# Patient Record
Sex: FEMALE | Race: OTHER | HISPANIC OR LATINO
[De-identification: names, ages, dates, MRNs, and addresses within clinical notes are randomized per-mention and may not be internally consistent; named-entity substitution may affect disease eponyms.]

---

## 2023-06-03 PROBLEM — Z00.00 ENCOUNTER FOR PREVENTIVE HEALTH EXAMINATION: Status: ACTIVE | Noted: 2023-06-03

## 2023-06-07 ENCOUNTER — NON-APPOINTMENT (OUTPATIENT)
Age: 38
End: 2023-06-07

## 2023-06-12 ENCOUNTER — APPOINTMENT (OUTPATIENT)
Dept: BARIATRICS | Facility: CLINIC | Age: 38
End: 2023-06-12
Payer: COMMERCIAL

## 2023-06-12 VITALS
SYSTOLIC BLOOD PRESSURE: 119 MMHG | DIASTOLIC BLOOD PRESSURE: 78 MMHG | OXYGEN SATURATION: 100 % | HEART RATE: 84 BPM | WEIGHT: 187 LBS | TEMPERATURE: 97.5 F | HEIGHT: 61 IN | BODY MASS INDEX: 35.3 KG/M2

## 2023-06-12 DIAGNOSIS — E78.1 PURE HYPERGLYCERIDEMIA: ICD-10-CM

## 2023-06-12 DIAGNOSIS — Z78.9 OTHER SPECIFIED HEALTH STATUS: ICD-10-CM

## 2023-06-12 DIAGNOSIS — Z82.49 FAMILY HISTORY OF ISCHEMIC HEART DISEASE AND OTHER DISEASES OF THE CIRCULATORY SYSTEM: ICD-10-CM

## 2023-06-12 DIAGNOSIS — Z83.49 FAMILY HISTORY OF OTHER ENDOCRINE, NUTRITIONAL AND METABOLIC DISEASES: ICD-10-CM

## 2023-06-12 DIAGNOSIS — Z80.0 FAMILY HISTORY OF MALIGNANT NEOPLASM OF DIGESTIVE ORGANS: ICD-10-CM

## 2023-06-12 DIAGNOSIS — Z82.5 FAMILY HISTORY OF ASTHMA AND OTHER CHRONIC LOWER RESPIRATORY DISEASES: ICD-10-CM

## 2023-06-12 DIAGNOSIS — Z83.3 FAMILY HISTORY OF DIABETES MELLITUS: ICD-10-CM

## 2023-06-12 DIAGNOSIS — Z87.19 PERSONAL HISTORY OF OTHER DISEASES OF THE DIGESTIVE SYSTEM: ICD-10-CM

## 2023-06-12 DIAGNOSIS — M25.473 EFFUSION, UNSPECIFIED ANKLE: ICD-10-CM

## 2023-06-12 PROCEDURE — 99204 OFFICE O/P NEW MOD 45 MIN: CPT

## 2023-06-12 NOTE — HISTORY OF PRESENT ILLNESS
[de-identified] : Patient is a 37 year old woman with history of hypertension, hyperlipidemia, asthma, GERD, gastritis, cholelithiasis, diverticulosis, surgical history of Caesarean section who presents now with morbid obesity and for evaluation of bariatric surgery. The patient has been overweight for more than 5 years and has tried multiple diet regimens which resulted in weight loss that was regained in the months following the diet. The patient has also tried fad diets to lose weight without success. \par \par Today's weight: 187\par Highest weight: 195\par Height: 5'1"\par \par Smoking history? marijuana twice a week\par Alcohol use? social\par NSAID use? none\par Birth control: none\par Cardiac/Vascular Stents: none\par ICD/Pacemaker: none\par Blood Thinners: none\par DVT/PE History: none \par Swallowing Issues: none\par GERD/GI Symptoms: intermittent, triggered with spicy foods\par \par STOP BANG Questionnaire\par Does patient snore? yes\par Does patient often feel tired, fatigued, or sleepy during daytime? yes\par Has anyone observed patient stop breathing during sleep? no\par Does patient have or is being treated for high blood pressure? yes\par BMI > 35? yes\par Age > 50 years old? no\par Neck circumference: medium\par Male gender? no\par SCORE =  4\par \par CIARA - Low Risk : Yes to 0-2 questions\par CIARA - Intermediate Risk: Yes to 3-4 questions\par CIARA - High Risk: Yes to 5-8 questions\par \par

## 2023-06-12 NOTE — ASSESSMENT
[FreeTextEntry1] : It was my pleasure to interact with Ms. SHEKHAR WILL today. In summary, Ms. SHEKHAR WILL has morbid obesity refractory to medical and dietary efforts for which She could benefit from weight loss surgery.  We discussed in detail the Shelby-en-Y gastric bypass, sleeve gastrectomy, and modified duodenal switch (KT/SIPS). She understood that these procedures are done primarily minimally invasively (laparoscopically or robotically), but that there is a possibility of conversion to laparotomy. In this particular case, with their body mass index we discussed realistic expectations with respect to weight loss.  Approximately 50% of excess weight will be lost if She has gastric bypass or sleeve gastrectomy, or, approximately 60% if She  has KT/SIPS.  In order to maintain weight loss or lose more weight, She will be required to modify diet and exercise habits (the "tool" concept of weight loss surgery).  We discussed the necessity for continuous, life-long medical care following surgery and the necessity for taking mineral and vitamin supplements daily for the rest of life. We discussed the importance of high protein diet and daily exercise for maximal success.\par \par We discussed complications that may follow bariatric surgery that include, but are not limited to, respiratory problems, pneumonia, thrombophlebitis, and pulmonary embolus.  We also discussed intra-abdominal complications including anastomotic leak, intra-abdominal infections, portal vein thrombosis and death that may result from bariatric surgery.  We discussed that there may be other complications related to a specific type of surgery, such as that gastric bypass patients may have severe dumping secondary to dietary indiscretion. With respect to sleeve gastrectomy we discussed the chances of having refractory GERD that may require intervention in the future including conversion to gastric bypass. We also discussed postoperative DVT prophylaxis to decrease the incidence of deep vein thrombosis when indicated. \par \par I specifically addressed the patient regarding pregnancy.  Weight loss surgery patients should not become pregnant in the first two years following surgery because of the high risk of fetal malformation and miscarriage.\par \par The prolonged discussion (greater than 45 minutes) centered primarily on the indications for surgery and evaluation of the risk/benefit ratio for Ms. SHEKHAR WILL and other weight loss alternatives. I answered all questions about bariatric surgery and possible complications to the best of my ability.\par \par Once the preoperative evaluation is complete, I will review the chart and schedule the patient for a follow-up visit in order to answer any questions prior to scheduling surgery.\par \par Plan:\par Nutrition, Psych evaluations\par Pulmonary evaluations\par EGD to evaluate for GERD\par Interested in sleeve\par \par I, Dr. Noe Villeda, spent 50 minutes with the patient >50% counseling/coordination of care including, reviewing the patient's history, performing an examination, reviewing relevant labs and radiographic imaging, reviewing PCP and consultant notes, discussion of medical and surgical management of the diagnosis as well as associated risks and benefits, and completing documentation.\par

## 2023-06-13 LAB
25(OH)D3 SERPL-MCNC: 18.7 NG/ML
ANION GAP SERPL CALC-SCNC: 11 MMOL/L
APPEARANCE: CLEAR
APTT BLD: 28.3 SEC
BACTERIA: NEGATIVE /HPF
BILIRUBIN URINE: NEGATIVE
BLOOD URINE: NEGATIVE
BUN SERPL-MCNC: 9 MG/DL
CA-I SERPL-SCNC: 4.9 MG/DL
CALCIUM SERPL-MCNC: 9.3 MG/DL
CALCIUM SERPL-MCNC: 9.3 MG/DL
CAST: 1 /LPF
CHLORIDE SERPL-SCNC: 105 MMOL/L
CHOLEST SERPL-MCNC: 288 MG/DL
CO2 SERPL-SCNC: 26 MMOL/L
COLOR: YELLOW
CREAT SERPL-MCNC: 0.75 MG/DL
EGFR: 104 ML/MIN/1.73M2
EPITHELIAL CELLS: 2 /HPF
ESTIMATED AVERAGE GLUCOSE: 123 MG/DL
FOLATE SERPL-MCNC: 17.7 NG/ML
GLUCOSE QUALITATIVE U: NEGATIVE MG/DL
GLUCOSE SERPL-MCNC: 97 MG/DL
HBA1C MFR BLD HPLC: 5.9 %
HCG SERPL QL: NEGATIVE
HDLC SERPL-MCNC: 50 MG/DL
INR PPP: 1.05 RATIO
IRON SATN MFR SERPL: 11 %
IRON SERPL-MCNC: 41 UG/DL
KETONES URINE: NEGATIVE MG/DL
LDLC SERPL CALC-MCNC: 195 MG/DL
LEUKOCYTE ESTERASE URINE: NEGATIVE
MICROSCOPIC-UA: NORMAL
NITRITE URINE: NEGATIVE
NONHDLC SERPL-MCNC: 238 MG/DL
PAPP-A SERPL-ACNC: <1 MIU/ML
PARATHYROID HORMONE INTACT: 39 PG/ML
PH URINE: 5.5
POTASSIUM SERPL-SCNC: 4.5 MMOL/L
PREALB SERPL NEPH-MCNC: 23 MG/DL
PROTEIN URINE: NEGATIVE MG/DL
PT BLD: 12.2 SEC
RED BLOOD CELLS URINE: 6 /HPF
SODIUM SERPL-SCNC: 141 MMOL/L
SPECIFIC GRAVITY URINE: 1.03
TIBC SERPL-MCNC: 355 UG/DL
TRIGL SERPL-MCNC: 219 MG/DL
TSH SERPL-ACNC: 1.01 UIU/ML
UIBC SERPL-MCNC: 315 UG/DL
UROBILINOGEN URINE: 0.2 MG/DL
VIT B12 SERPL-MCNC: 689 PG/ML
WHITE BLOOD CELLS URINE: 1 /HPF

## 2023-06-26 ENCOUNTER — APPOINTMENT (OUTPATIENT)
Dept: BARIATRICS | Facility: CLINIC | Age: 38
End: 2023-06-26

## 2023-06-26 LAB
A-TOCOPHEROL VIT E SERPL-MCNC: 15.3 MG/L
BETA+GAMMA TOCOPHEROL SERPL-MCNC: 4.2 MG/L
UREA BREATH TEST QL: NEGATIVE
VIT A SERPL-MCNC: 37 UG/DL
VIT B1 SERPL-MCNC: 131.3 NMOL/L
ZINC SERPL-MCNC: 100 UG/DL

## 2023-06-30 ENCOUNTER — APPOINTMENT (OUTPATIENT)
Dept: BARIATRICS | Facility: CLINIC | Age: 38
End: 2023-06-30
Payer: COMMERCIAL

## 2023-06-30 VITALS — BODY MASS INDEX: 36.09 KG/M2 | WEIGHT: 191 LBS

## 2023-06-30 PROCEDURE — 98968 PH1 ASSMT&MGMT NQHP 21-30: CPT

## 2023-07-15 ENCOUNTER — TRANSCRIPTION ENCOUNTER (OUTPATIENT)
Age: 38
End: 2023-07-15

## 2023-07-22 VITALS — HEIGHT: 61 IN | BODY MASS INDEX: 35.93 KG/M2 | WEIGHT: 190.31 LBS

## 2023-07-28 ENCOUNTER — APPOINTMENT (OUTPATIENT)
Dept: BARIATRICS | Facility: CLINIC | Age: 38
End: 2023-07-28
Payer: COMMERCIAL

## 2023-07-28 VITALS — BODY MASS INDEX: 35.9 KG/M2 | WEIGHT: 190 LBS

## 2023-07-28 PROCEDURE — 98966 PH1 ASSMT&MGMT NQHP 5-10: CPT

## 2023-08-09 ENCOUNTER — APPOINTMENT (OUTPATIENT)
Dept: PULMONOLOGY | Facility: CLINIC | Age: 38
End: 2023-08-09
Payer: COMMERCIAL

## 2023-08-09 ENCOUNTER — RESULT REVIEW (OUTPATIENT)
Age: 38
End: 2023-08-09

## 2023-08-09 ENCOUNTER — OUTPATIENT (OUTPATIENT)
Dept: OUTPATIENT SERVICES | Facility: HOSPITAL | Age: 38
LOS: 1 days | End: 2023-08-09
Payer: COMMERCIAL

## 2023-08-09 VITALS
BODY MASS INDEX: 36.44 KG/M2 | WEIGHT: 193 LBS | DIASTOLIC BLOOD PRESSURE: 87 MMHG | OXYGEN SATURATION: 100 % | SYSTOLIC BLOOD PRESSURE: 119 MMHG | HEIGHT: 61 IN | HEART RATE: 100 BPM

## 2023-08-09 PROCEDURE — 71046 X-RAY EXAM CHEST 2 VIEWS: CPT | Mod: 26

## 2023-08-09 PROCEDURE — 94729 DIFFUSING CAPACITY: CPT

## 2023-08-09 PROCEDURE — 71046 X-RAY EXAM CHEST 2 VIEWS: CPT

## 2023-08-09 PROCEDURE — 94727 GAS DIL/WSHOT DETER LNG VOL: CPT

## 2023-08-09 PROCEDURE — 94060 EVALUATION OF WHEEZING: CPT

## 2023-08-09 PROCEDURE — 94618 PULMONARY STRESS TESTING: CPT

## 2023-08-09 PROCEDURE — 99203 OFFICE O/P NEW LOW 30 MIN: CPT | Mod: 25

## 2023-08-14 NOTE — CONSULT LETTER
[Dear  ___] : Dear  [unfilled], [Consult Letter:] : I had the pleasure of evaluating your patient, [unfilled]. [Please see my note below.] : Please see my note below. [Consult Closing:] : Thank you very much for allowing me to participate in the care of this patient.  If you have any questions, please do not hesitate to contact me. [Sincerely,] : Sincerely, [FreeTextEntry2] : Dr. Noe Villeda [FreeTextEntry1] : I saw Ms. Burnett today in consultation. I requested PFTs (normal), CXR (pending), and sleep study (pending). Given her young age and well-controlled asthma, I think she will be low risk of perioperative pulmonary complication. I will see her back in 1 month.  Please call me with additional thoughts.  Pepito Valenzuela C: [phone] [FreeTextEntry3] : Pepito Valenzuela

## 2023-08-14 NOTE — ADDENDUM
[FreeTextEntry1] : Addendum ([facility]; 8/14/23): I called Ms. Burnett about her CXR, which was normal. We will await her sleep study results.  CXR (8/9/23): The lungs are clear.

## 2023-08-14 NOTE — ASSESSMENT
[FreeTextEntry1] : Labs: WBC 5.13 (), Hgb 12.1, Plts 330 Na 141, K 4.5, Cl 105, HCO3 26, BUN/creat 9/0.75, glucose 97  CXR: Pending  PFTs           2023 FEV1/FVC  76% FEV1           2.56, 110% FVC             3.36, 121% TLC             4.42, 100% RV               1.27, 90% DLCO          19.39, 80% *Positive bronchodilator response (FEV1 improved 300 mL, 13%)  6MWT 23: 6MWT distance 548 meters (1798 feet); va SpO2 98% on room air  STOP-BAN/8  Asthma Control Test: 23: 24  ESS:  23:   Canet score: 15 (low (1.6%) risk of pulmonary complication  A/P: 36 yo F h/o obesity, asthma, GERD, and insomnia is referred for asthma evaluation and perioperative pulmonary risk assessment.  Ms. Burnett's asthma is well-controlled due to low symptom burden and no exacerbations. Her PFTs are normal, and bronchodilator-responsiveness is noted. We discussed that optimal asthma therapy includes Single Maintenance and Reliever Therapy (SMART) with PRN Symbicort to provide anti-inflammatory effect as well as symptomatic improvement. She understands and agrees. We discussed washing her mouth out after Symbicort use to prevent infection.  From a risk perspective, Ms. Burnett is low risk for perioperative pulmonary complications. Her STOP-BANG does suggest we should pursue a sleep study. I would prefer an in-lab sleep study, and I requested a split night sleep study given her high pre-test probability.  1. Asthma: mild intermittent, well-controlled 2. Obesity 3. Snoring 4. Preoperative pulmonary risk assessment  -check PFTs and 6MWT today -check CXR -request sleep study (split night) -continue PRN Albuterol; trial PRN Symbicort -we discussed the benefits of regular physical activity and allergen avoidance in asthma control -follow-up with me in 1 month

## 2023-08-14 NOTE — REVIEW OF SYSTEMS
[Negative] : Endocrine [TextBox_3] : Insomnia [TextBox_30] : Wheezing and coughing with respiratory triggers

## 2023-08-14 NOTE — HISTORY OF PRESENT ILLNESS
[Never] : never [TextBox_4] : I saw and evaluated Ms. Sharon Burnett in consultation for perioperative evaluation for weight loss surgery and asthma evaluation. In review, Ms. Burnett is a 38 yo F h/o childhood asthma, GERD, insomnia, and obesity. She was evaluated by Dr. Villeda in 2023.  Today, Ms. Burnett is feeling well. She affirms the above history. She had multiple ER visits for her asthma as a child. She was never hospitalized nor did she require mechanical ventilation. She only remembers being treated with Albuterol and steroids. With age, her asthma has been under better control, and she has not had an exacerbation or ER visit in "years." She continues to use Albuterol 1-2 times/month. Triggers include cats, pollen, and dust. Alleviating factors include exercise and Albuterol. She remains active and denies activity limitation. She does have trouble falling asleep and staying asleep, though she denies night-time awakenings due to asthma.  Regarding sleep, she endorses insomnia, snoring, night-time apnea (per sleep partners), daytime fatigue, and taking naps when not participating in tasks. She denies sleepiness or falling asleep while driving. Due to her 8 year old son, she does not take many naps during the day.  PMH: Asthma GERD Insomnia Cholelithiasis Obesity HLD Diverticulitis  PSH:   Allergies: none  FH: Mom with asthma, HTN Dad: unknown medical history Grandparent and brother with asthma  SH: Ms. Burnett lives in the [place]. She was raised in [place]. 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  Ms. Burnett is a never smoker. She denies vaping. She smokes marijuana rarely (<1X/month). She drinks alcohol at 1-2 drinks/week.  Ms. Burnett endorses exposure to mold in a prior apartment. She denies exposure to Asbestos, Tuberculosis, mold (in current home), dust, smoke, heavy metals, Beryllium, birds, feathers, or hot tubs.

## 2023-08-14 NOTE — PHYSICAL EXAM
[No Acute Distress] : no acute distress [Normal Oropharynx] : normal oropharynx [Normal Appearance] : normal appearance [No Neck Mass] : no neck mass [Normal Rate/Rhythm] : normal rate/rhythm [Normal S1, S2] : normal s1, s2 [No Murmurs] : no murmurs [No Resp Distress] : no resp distress [Clear to Auscultation Bilaterally] : clear to auscultation bilaterally [No Abnormalities] : no abnormalities [Benign] : benign [Normal Gait] : normal gait [No Clubbing] : no clubbing [No Cyanosis] : no cyanosis [No Edema] : no edema [FROM] : FROM [Normal Color/ Pigmentation] : normal color/ pigmentation [No Focal Deficits] : no focal deficits [Oriented x3] : oriented x3 [Normal Affect] : normal affect [TextBox_11] : Neck circumference 38 cm

## 2023-08-28 VITALS — WEIGHT: 193 LBS | HEIGHT: 61 IN | BODY MASS INDEX: 36.44 KG/M2

## 2023-09-08 ENCOUNTER — APPOINTMENT (OUTPATIENT)
Dept: SLEEP CENTER | Facility: HOSPITAL | Age: 38
End: 2023-09-08

## 2023-09-08 ENCOUNTER — OUTPATIENT (OUTPATIENT)
Dept: OUTPATIENT SERVICES | Facility: HOSPITAL | Age: 38
LOS: 1 days | End: 2023-09-08
Payer: COMMERCIAL

## 2023-09-08 DIAGNOSIS — G47.33 OBSTRUCTIVE SLEEP APNEA (ADULT) (PEDIATRIC): ICD-10-CM

## 2023-09-08 PROCEDURE — 95810 POLYSOM 6/> YRS 4/> PARAM: CPT | Mod: 26

## 2023-09-08 PROCEDURE — 95810 POLYSOM 6/> YRS 4/> PARAM: CPT

## 2023-09-19 ENCOUNTER — APPOINTMENT (OUTPATIENT)
Dept: HEART AND VASCULAR | Facility: CLINIC | Age: 38
End: 2023-09-19
Payer: COMMERCIAL

## 2023-09-19 VITALS
HEART RATE: 89 BPM | WEIGHT: 196 LBS | BODY MASS INDEX: 37 KG/M2 | OXYGEN SATURATION: 98 % | DIASTOLIC BLOOD PRESSURE: 80 MMHG | TEMPERATURE: 96 F | HEIGHT: 61 IN | SYSTOLIC BLOOD PRESSURE: 114 MMHG

## 2023-09-19 PROCEDURE — 93000 ELECTROCARDIOGRAM COMPLETE: CPT

## 2023-09-19 PROCEDURE — 99205 OFFICE O/P NEW HI 60 MIN: CPT | Mod: 25

## 2023-09-29 ENCOUNTER — APPOINTMENT (OUTPATIENT)
Dept: HEART AND VASCULAR | Facility: CLINIC | Age: 38
End: 2023-09-29
Payer: COMMERCIAL

## 2023-09-29 VITALS
HEART RATE: 78 BPM | OXYGEN SATURATION: 98 % | HEIGHT: 61 IN | WEIGHT: 196 LBS | SYSTOLIC BLOOD PRESSURE: 129 MMHG | BODY MASS INDEX: 37 KG/M2 | DIASTOLIC BLOOD PRESSURE: 83 MMHG

## 2023-09-29 PROCEDURE — 93306 TTE W/DOPPLER COMPLETE: CPT

## 2023-10-02 ENCOUNTER — OUTPATIENT (OUTPATIENT)
Dept: OUTPATIENT SERVICES | Facility: HOSPITAL | Age: 38
LOS: 1 days | Discharge: HOME | End: 2023-10-02
Payer: COMMERCIAL

## 2023-10-02 ENCOUNTER — TRANSCRIPTION ENCOUNTER (OUTPATIENT)
Age: 38
End: 2023-10-02

## 2023-10-02 ENCOUNTER — RESULT REVIEW (OUTPATIENT)
Age: 38
End: 2023-10-02

## 2023-10-02 VITALS
HEART RATE: 87 BPM | SYSTOLIC BLOOD PRESSURE: 130 MMHG | WEIGHT: 195.99 LBS | DIASTOLIC BLOOD PRESSURE: 78 MMHG | HEIGHT: 61 IN | TEMPERATURE: 98 F | OXYGEN SATURATION: 98 % | RESPIRATION RATE: 15 BRPM

## 2023-10-02 PROCEDURE — 43239 EGD BIOPSY SINGLE/MULTIPLE: CPT

## 2023-10-02 PROCEDURE — 88305 TISSUE EXAM BY PATHOLOGIST: CPT | Mod: 26

## 2023-10-02 PROCEDURE — 88305 TISSUE EXAM BY PATHOLOGIST: CPT

## 2023-10-04 ENCOUNTER — APPOINTMENT (OUTPATIENT)
Dept: PULMONOLOGY | Facility: CLINIC | Age: 38
End: 2023-10-04

## 2023-10-04 ENCOUNTER — APPOINTMENT (OUTPATIENT)
Dept: PULMONOLOGY | Facility: CLINIC | Age: 38
End: 2023-10-04
Payer: COMMERCIAL

## 2023-10-04 VITALS
HEART RATE: 80 BPM | HEIGHT: 61 IN | DIASTOLIC BLOOD PRESSURE: 84 MMHG | WEIGHT: 196 LBS | BODY MASS INDEX: 37 KG/M2 | TEMPERATURE: 97.5 F | OXYGEN SATURATION: 98 % | SYSTOLIC BLOOD PRESSURE: 130 MMHG

## 2023-10-04 DIAGNOSIS — Z23 ENCOUNTER FOR IMMUNIZATION: ICD-10-CM

## 2023-10-04 LAB — SURGICAL PATHOLOGY STUDY: SIGNIFICANT CHANGE UP

## 2023-10-04 PROCEDURE — 90686 IIV4 VACC NO PRSV 0.5 ML IM: CPT

## 2023-10-04 PROCEDURE — G0008: CPT

## 2023-10-04 PROCEDURE — 99213 OFFICE O/P EST LOW 20 MIN: CPT | Mod: 25

## 2023-10-05 DIAGNOSIS — J45.909 UNSPECIFIED ASTHMA, UNCOMPLICATED: ICD-10-CM

## 2023-10-05 DIAGNOSIS — I10 ESSENTIAL (PRIMARY) HYPERTENSION: ICD-10-CM

## 2023-10-05 DIAGNOSIS — K21.9 GASTRO-ESOPHAGEAL REFLUX DISEASE WITHOUT ESOPHAGITIS: ICD-10-CM

## 2023-10-05 DIAGNOSIS — Z01.818 ENCOUNTER FOR OTHER PREPROCEDURAL EXAMINATION: ICD-10-CM

## 2023-10-05 DIAGNOSIS — E78.6 LIPOPROTEIN DEFICIENCY: ICD-10-CM

## 2023-10-05 DIAGNOSIS — E66.01 MORBID (SEVERE) OBESITY DUE TO EXCESS CALORIES: ICD-10-CM

## 2023-10-11 ENCOUNTER — APPOINTMENT (OUTPATIENT)
Dept: HEART AND VASCULAR | Facility: CLINIC | Age: 38
End: 2023-10-11

## 2023-11-08 ENCOUNTER — APPOINTMENT (OUTPATIENT)
Dept: FAMILY MEDICINE | Facility: CLINIC | Age: 38
End: 2023-11-08
Payer: COMMERCIAL

## 2023-11-08 DIAGNOSIS — K57.90 DIVERTICULOSIS OF INTESTINE, PART UNSPECIFIED, W/OUT PERFORATION OR ABSCESS W/OUT BLEEDING: ICD-10-CM

## 2023-11-08 DIAGNOSIS — M54.50 LOW BACK PAIN, UNSPECIFIED: ICD-10-CM

## 2023-11-08 DIAGNOSIS — E28.2 POLYCYSTIC OVARIAN SYNDROME: ICD-10-CM

## 2023-11-08 DIAGNOSIS — J45.909 UNSPECIFIED ASTHMA, UNCOMPLICATED: ICD-10-CM

## 2023-11-08 DIAGNOSIS — K21.9 GASTRO-ESOPHAGEAL REFLUX DISEASE W/OUT ESOPHAGITIS: ICD-10-CM

## 2023-11-08 DIAGNOSIS — I83.93 ASYMPTOMATIC VARICOSE VEINS OF BILATERAL LOWER EXTREMITIES: ICD-10-CM

## 2023-11-08 DIAGNOSIS — E55.9 VITAMIN D DEFICIENCY, UNSPECIFIED: ICD-10-CM

## 2023-11-08 DIAGNOSIS — Z78.9 OTHER SPECIFIED HEALTH STATUS: ICD-10-CM

## 2023-11-08 DIAGNOSIS — K57.92 DIVERTICULITIS OF INTESTINE, PART UNSPECIFIED, W/OUT PERFORATION OR ABSCESS W/OUT BLEEDING: ICD-10-CM

## 2023-11-08 DIAGNOSIS — K80.20 CALCULUS OF GALLBLADDER W/OUT CHOLECYSTITIS W/OUT OBSTRUCTION: ICD-10-CM

## 2023-11-08 DIAGNOSIS — R06.09 OTHER FORMS OF DYSPNEA: ICD-10-CM

## 2023-11-08 DIAGNOSIS — M25.569 PAIN IN UNSPECIFIED KNEE: ICD-10-CM

## 2023-11-08 DIAGNOSIS — E78.00 PURE HYPERCHOLESTEROLEMIA, UNSPECIFIED: ICD-10-CM

## 2023-11-08 PROCEDURE — 99204 OFFICE O/P NEW MOD 45 MIN: CPT | Mod: 95

## 2023-11-08 RX ORDER — ALBUTEROL SULFATE 90 UG/1
108 (90 BASE) INHALANT RESPIRATORY (INHALATION)
Qty: 1 | Refills: 11 | Status: ACTIVE | COMMUNITY
Start: 2023-08-09

## 2023-11-08 RX ORDER — BUDESONIDE AND FORMOTEROL FUMARATE DIHYDRATE 80; 4.5 UG/1; UG/1
80-4.5 AEROSOL RESPIRATORY (INHALATION) TWICE DAILY
Qty: 1 | Refills: 6 | Status: ACTIVE | COMMUNITY
Start: 2023-08-09

## 2023-11-08 RX ORDER — ROSUVASTATIN CALCIUM 10 MG/1
10 TABLET, FILM COATED ORAL
Qty: 90 | Refills: 0 | Status: ACTIVE | COMMUNITY

## 2023-11-08 RX ORDER — ERGOCALCIFEROL 1.25 MG/1
1.25 MG CAPSULE, LIQUID FILLED ORAL
Qty: 12 | Refills: 0 | Status: DISCONTINUED | COMMUNITY
Start: 2023-06-20 | End: 2023-11-08

## 2023-11-10 ENCOUNTER — NON-APPOINTMENT (OUTPATIENT)
Age: 38
End: 2023-11-10

## 2023-11-21 ENCOUNTER — APPOINTMENT (OUTPATIENT)
Dept: HEART AND VASCULAR | Facility: CLINIC | Age: 38
End: 2023-11-21
Payer: COMMERCIAL

## 2023-11-21 ENCOUNTER — NON-APPOINTMENT (OUTPATIENT)
Age: 38
End: 2023-11-21

## 2023-11-21 VITALS
HEIGHT: 61 IN | SYSTOLIC BLOOD PRESSURE: 124 MMHG | DIASTOLIC BLOOD PRESSURE: 82 MMHG | OXYGEN SATURATION: 98 % | BODY MASS INDEX: 37.76 KG/M2 | WEIGHT: 200 LBS | HEART RATE: 101 BPM

## 2023-11-21 VITALS — DIASTOLIC BLOOD PRESSURE: 81 MMHG | SYSTOLIC BLOOD PRESSURE: 126 MMHG

## 2023-11-21 DIAGNOSIS — R06.02 SHORTNESS OF BREATH: ICD-10-CM

## 2023-11-21 DIAGNOSIS — R07.9 CHEST PAIN, UNSPECIFIED: ICD-10-CM

## 2023-11-21 PROCEDURE — 36415 COLL VENOUS BLD VENIPUNCTURE: CPT

## 2023-11-21 PROCEDURE — 99214 OFFICE O/P EST MOD 30 MIN: CPT | Mod: 25

## 2023-11-27 LAB
ALBUMIN SERPL ELPH-MCNC: 4.2 G/DL
ALP BLD-CCNC: 69 U/L
ALT SERPL-CCNC: 14 U/L
ANION GAP SERPL CALC-SCNC: 12 MMOL/L
APO LP(A) SERPL-MCNC: 193 NMOL/L
AST SERPL-CCNC: 19 U/L
BILIRUB SERPL-MCNC: 0.2 MG/DL
BUN SERPL-MCNC: 12 MG/DL
CALCIUM SERPL-MCNC: 9.8 MG/DL
CHLORIDE SERPL-SCNC: 104 MMOL/L
CO2 SERPL-SCNC: 24 MMOL/L
CREAT SERPL-MCNC: 0.69 MG/DL
EGFR: 114 ML/MIN/1.73M2
GLUCOSE SERPL-MCNC: 158 MG/DL
POTASSIUM SERPL-SCNC: 3.9 MMOL/L
PROT SERPL-MCNC: 7.4 G/DL
SODIUM SERPL-SCNC: 140 MMOL/L

## 2023-12-18 ENCOUNTER — APPOINTMENT (OUTPATIENT)
Dept: CT IMAGING | Facility: HOSPITAL | Age: 38
End: 2023-12-18

## 2023-12-27 ENCOUNTER — APPOINTMENT (OUTPATIENT)
Dept: HEART AND VASCULAR | Facility: CLINIC | Age: 38
End: 2023-12-27
Payer: COMMERCIAL

## 2023-12-27 PROCEDURE — 99215 OFFICE O/P EST HI 40 MIN: CPT | Mod: 95

## 2023-12-27 RX ORDER — METFORMIN ER 500 MG 500 MG/1
500 TABLET ORAL
Qty: 90 | Refills: 1 | Status: ACTIVE | COMMUNITY
Start: 2023-12-27 | End: 1900-01-01

## 2023-12-27 NOTE — DISCUSSION/SUMMARY
[FreeTextEntry1] : 39 y/o female with h/o asthma, predm, gerd, hl, obesity, pcos who presents today as a referral from Dr. Cage for weight management.   Dietary and exercise habits reviewed and discussed with over 50% of the visit spent discussing cardiovascular disease, the optimization of risk factors and lifestyle strategies that can be used to achieve this. - pt prefers to avoid injectables for now; will lower her metformin dosage to 500mg qd and send the ER formulation to hopefully avoid any PASHA; the higher dosage from her GYN may have just been too much to start on - advised she wait to finish her amoxicillin Rx just to avoid any interactions (although safe w/ the metformin) - Encouraged patient to continue healthy exercise and eating habits, focusing on a Mediterranean style of eating w/ more plant based foods in general, and aiming for the recommended 150 minutes per week of moderate physical activity. Advised she try to have more whole grain foods and less refined carbs.  - will f/u in one month to assess her tolerance/progress; will plan to recheck A1c in ~3 months.   She will continue to follow w/ Dr. Cage.

## 2023-12-27 NOTE — HISTORY OF PRESENT ILLNESS
[Home] : at home, [unfilled] , at the time of the visit. [Medical Office: ([facility])___] : at the medical office located in  [Verbal consent obtained from patient] : the patient, [unfilled] [FreeTextEntry1] : 37 y/o female with h/o asthma, predm, gerd, hl, obesity, pcos who presents today as a referral from Dr. Cage for weight management.   She feels her weakness is bread; doesn't eat sweets or dessert very often. She has bread w/ most meals; thinks that is what caused her to be prediabetic.  She used to be good about meal prepping; had some personal issues w/ a divorce, etc. and it got too difficult for her.   She was started on metformin by her GYN, but on 850mg BID. She had very uncomfortable GI side effects that were affecting her ability to work, so she stopped it. 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 No dedicated exercise otherwise, but walking for commute and work, climbing stairs, etc. Not a sedentary job.   She reports just starting course of amoxicillin for flare up of diverticulitis.   =========================================================================================== -CTA ordered - not done  -Echo 10/23: 1. Left ventricular cavity is normally sized. Left ventricular wall thickness is normal. Left ventricular systolic function is normal with an ejection fraction of 66 % by Stewart's method of disks. 2. Normal left ventricular diastolic function. 3. Normal right ventricular cavity size, wall thickness and systolic function. 4. No significant valvular disease. 5. No pericardial effusion seen.  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

## 2024-01-05 ENCOUNTER — APPOINTMENT (OUTPATIENT)
Dept: CT IMAGING | Facility: CLINIC | Age: 39
End: 2024-01-05
Payer: COMMERCIAL

## 2024-01-05 ENCOUNTER — OUTPATIENT (OUTPATIENT)
Dept: OUTPATIENT SERVICES | Facility: HOSPITAL | Age: 39
LOS: 1 days | End: 2024-01-05

## 2024-01-05 DIAGNOSIS — Z03.89 ENCOUNTER FOR OBSERVATION FOR OTHER SUSPECTED DISEASES AND CONDITIONS RULED OUT: ICD-10-CM

## 2024-01-05 PROCEDURE — 75574 CT ANGIO HRT W/3D IMAGE: CPT | Mod: 26

## 2024-01-09 ENCOUNTER — APPOINTMENT (OUTPATIENT)
Dept: PULMONOLOGY | Facility: CLINIC | Age: 39
End: 2024-01-09

## 2024-01-17 ENCOUNTER — APPOINTMENT (OUTPATIENT)
Dept: BARIATRICS | Facility: CLINIC | Age: 39
End: 2024-01-17
Payer: COMMERCIAL

## 2024-01-17 ENCOUNTER — OUTPATIENT (OUTPATIENT)
Dept: OUTPATIENT SERVICES | Facility: HOSPITAL | Age: 39
LOS: 1 days | End: 2024-01-17
Payer: COMMERCIAL

## 2024-01-17 ENCOUNTER — RESULT REVIEW (OUTPATIENT)
Age: 39
End: 2024-01-17

## 2024-01-17 VITALS
SYSTOLIC BLOOD PRESSURE: 126 MMHG | TEMPERATURE: 97.2 F | HEART RATE: 92 BPM | BODY MASS INDEX: 36.82 KG/M2 | HEIGHT: 61 IN | DIASTOLIC BLOOD PRESSURE: 85 MMHG | WEIGHT: 195 LBS | OXYGEN SATURATION: 98 %

## 2024-01-17 PROCEDURE — 71046 X-RAY EXAM CHEST 2 VIEWS: CPT | Mod: 26

## 2024-01-17 PROCEDURE — 71046 X-RAY EXAM CHEST 2 VIEWS: CPT

## 2024-01-17 PROCEDURE — 99214 OFFICE O/P EST MOD 30 MIN: CPT

## 2024-01-17 NOTE — HISTORY OF PRESENT ILLNESS
[de-identified] : Pt is a 37 y/o F with pmhx of morbid obesity BMI 37, PCOS, prediabetes, hospitalization in December for diverticulitis flare up, who presents today feeling well here for final visit for bariatric sx. Pt has completed all of the required preoperative testings and weigh ins which were all reviewed today. Pt states she has her initial diverticulitis episode 1 yr ago and most recently this past December and was advised should she develop another flare, she would need sx. Recommended metamucil daily which pt was previously told at the time of her d/c. EGD reviewed revealing 1-2cm HH. Pt denies daily GERD, reports food dependent symptoms and understands there is a 30% risk of developing GERD after the VSG. Reports past sxhx of . All of the risks, benefits, and alternatives to the proposed procedure were discussed with the pt in great detail and pt wishes to proceed with scheduling VSG and HH repair. Pt understands we do not recommend becoming pregnant within 18 mo after sx.

## 2024-01-17 NOTE — ADDENDUM
[FreeTextEntry1] : It was my pleasure to interact with Ms. SHEKHAR WILL today. In summary, Ms. SHEKHAR WILL has morbid obesity refractory to medical and dietary efforts for which She could benefit from weight loss surgery.  We discussed in detail the sleeve gastrectomy. She understood that these procedures are done primarily minimally invasively (laparoscopically or robotically), but that there is a possibility of conversion to laparotomy. In this particular case, with their body mass index we discussed realistic expectations with respect to weight loss.  Approximately 50% of excess weight will be lost if She has sleeve gastrectomy.  In order to maintain weight loss or lose more weight, She will be required to modify diet and exercise habits (the "tool" concept of weight loss surgery).  We discussed the necessity for continuous, life-long medical care following surgery and the necessity for taking mineral and vitamin supplements daily for the rest of life. We discussed the importance of high protein diet and daily exercise for maximal success.  We discussed complications that may follow bariatric surgery that include, but are not limited to, respiratory problems, pneumonia, thrombophlebitis, and pulmonary embolus.  We also discussed intra-abdominal complications including anastomotic leak, intra-abdominal infections, portal vein thrombosis and death that may result from bariatric surgery.  We discussed that there may be other complications related to a specific type of surgery. With respect to sleeve gastrectomy we discussed the chances of having refractory GERD that may require intervention in the future including conversion to gastric bypass. We also discussed postoperative DVT prophylaxis to decrease the incidence of deep vein thrombosis when indicated.   I specifically addressed the patient regarding pregnancy.  Weight loss surgery patients should not become pregnant in the first two years following surgery because of the high risk of fetal malformation and miscarriage.  The prolonged discussion (greater than 45 minutes) centered primarily on the indications for surgery and evaluation of the risk/benefit ratio for Ms. SHEKHAR WILL and other weight loss alternatives. I answered all questions about bariatric surgery and possible complications to the best of my ability.  Once the preoperative evaluation is complete, I will review the chart and schedule the patient for a follow-up visit in order to answer any questions prior to scheduling surgery.  Plan: Preoperative findings reviewed Will ensure appropriate pre-operative risk assessments and clearances Plan for robotic assisted sleeve gastrectomy with hiatal hernia repair  I, Dr. Noe Villeda, spent 35 minutes with the patient >50% counseling/coordination of care including, reviewing the patient's history, performing an examination, reviewing relevant labs and radiographic imaging, reviewing PCP and consultant notes, discussion of medical and surgical management of the diagnosis as well as associated risks and benefits, and completing documentation.

## 2024-01-17 NOTE — ASSESSMENT
[FreeTextEntry1] : Pt is a 37 y/o F with pmhx of morbid obesity BMI 37, PCOS, prediabetes, hospitalization in December for diverticulitis flare up, who presents today feeling well here for final visit for bariatric sx. Pt has completed all of the required preoperative testings and weigh ins which were all reviewed today. Pt states she has her initial diverticulitis episode 1 yr ago and most recently this past December and was advised should she develop another flare, she would need sx. Recommended metamucil daily which pt was previously told at the time of her d/c. EGD reviewed revealing 1-2cm HH. Pt denies daily GERD, reports food dependent symptoms and understands there is a 30% risk of developing GERD after the VSG. Reports past sxhx of . All of the risks, benefits, and alternatives to the proposed procedure were discussed with the pt in great detail and pt wishes to proceed with scheduling VSG and HH repair. Pt understands we do not recommend becoming pregnant within 18 mo after sx.

## 2024-02-01 ENCOUNTER — APPOINTMENT (OUTPATIENT)
Dept: HEART AND VASCULAR | Facility: CLINIC | Age: 39
End: 2024-02-01
Payer: COMMERCIAL

## 2024-02-01 DIAGNOSIS — E66.9 OBESITY, UNSPECIFIED: ICD-10-CM

## 2024-02-01 DIAGNOSIS — R73.03 PREDIABETES.: ICD-10-CM

## 2024-02-01 PROCEDURE — 99213 OFFICE O/P EST LOW 20 MIN: CPT

## 2024-02-01 NOTE — DISCUSSION/SUMMARY
[FreeTextEntry1] : 37 y/o female with h/o asthma, predm, gerd, hl, obesity, pcos who presents today as a referral from Dr. Cage for weight management.   Pt will stop the metformin for her surgery. The dietary changes and weight loss expected from bariatric surgery will likely change her need for medications; or at least require dosage adjustments. She will f/u after recovering from her surgery and we can reassess. She will be holding the metformin before her surgery; advised she not restart until she has seen us and/or PCP for continued management.   She will continue to follow w/ Dr. Cage.

## 2024-02-01 NOTE — HISTORY OF PRESENT ILLNESS
Physical Therapy Visit    Referred by: Raymond Luevano MD; Medical Diagnosis (from order):    Visit: 19    Visit Type: Daily Treatment Note    SUBJECTIVE                                                                                                               Patient reports that his shoulder is feeling good, he was slightly sore following his last appointment with the new activity. He has been continuing to notice improvements in his function.  Pain / Symptoms:  Pain rating (out of 10): Current: 3     OBJECTIVE                                                                                                                     Range of Motion (ROM)   (degrees unless noted; active unless noted; norms in ( ); negative=lacking to 0, positive=beyond 0)   Shoulder:     - Flexion (180):        • Right: 164     - Abduction (180):        • Right: 166      TREATMENT                                                                                                                  Therapeutic Exercise:  Seated pulleys in flexion - emphasis on maintaining good shoulder position without compensation x 2 minutes    Volley ball rolls 3 point x 10 each    Passive range of motion stretching in all planes to tolerance    Measurements    Neuromuscular Re-Education:  Rhythmic stabilization 4 point taps in 90 degrees of flexion x 3 rounds    Standing yellow band scaption 3 x 8    Protraction in standing on wall x 12    Standing yellow band ER x 15    Standing yellow band IR x 15    Skilled input: verbal instruction/cues, tactile instruction/cues and posture correction    Writer verbally educated and received verbal consent for hand placement, positioning of patient, and techniques to be performed today from patient for clothing adjustments for techniques, therapist position for techniques and hand placement and palpation for techniques as described above and how they are pertinent to the patient's plan of care.    Home Exercise  Program/Education Materials: Access Code: [email or web address]  URL: [email or web address]  Date: 06/27/2022  Prepared by: Reynold Estevez    Exercises  Supine Shoulder Flexion AAROM with Hands Clasped - 1 x daily - 7 x weekly - 3 sets - 10 reps  Circular Shoulder Pendulum with Table Support - 1 x daily - 7 x weekly - 3 sets - 10 reps  Seated Scapular Retraction - 1 x daily - 7 x weekly - 3 sets - 10 reps - 3 seconds hold    Added 6/27/2022  Isometric Shoulder Flexion at Wall - 1 x daily - 7 x weekly - 3 sets - 10 reps - 3 seconds hold  Standing Isometric Shoulder External Rotation with Doorway - 1 x daily - 7 x weekly - 3 sets - 10 reps - 3 seconds hold  Standing Isometric Shoulder Internal Rotation at Doorway - 1 x daily - 7 x weekly - 3 sets - 10 reps - 3 seconds hold  Isometric Shoulder Abduction at Wall - 1 x daily - 7 x weekly - 3 sets - 10 reps - 3 seconds hold  active range of motion flexion in supine  Green band row  Scaption wand focus on eccentric lowering   Inclined active assisted range of motion press to overhead     ASSESSMENT                                                                                                             Patient range of motion has been improving and he is able to elevate his right shoulder without difficulty to near full range of motion. He does demonstrate more difficulty with pure flexion and abduction which require more effort, but he is able to perform these with good scapular control and minimal upper trapezius compensation with cuing. Some difficulty with scaption with band, this required slightly more cuing. Yellow band given for at home ER/IR.  Patient Education:   Results of above outlined education: Verbalizes understanding and Demonstrates understanding      PLAN                                                                                                                           Suggestions for next session as indicated: Progress per plan of  [Home] : at home, [unfilled] , at the time of the visit. care         Therapy procedure time and total treatment time can be found documented on the Time Entry flowsheet   [Medical Office: ([facility])___] : at the medical office located in  [Verbal consent obtained from patient] : the patient, [unfilled] [FreeTextEntry1] : 37 y/o female with h/o asthma, predm, gerd, hl, obesity, pcos who presents today as a referral from Dr. Cage for weight management.   She is feeling well and is excited to have her VSG procedure in March and has completed all her preoperative requirements. Patient denies any chest pain, SOB, palpitations, orthopnea, PND or LE edema.  Since our last visit, she has been tolerating the metformin 500mg ER once daily well. Feels the 850mg from her GYN was just too high of a dosage to start on.  She is excited about the prospect of not needing meds, or as many, after her surgery.  =========================================================================================== -CTA ordered - not done  -Echo 10/23: 1. Left ventricular cavity is normally sized. Left ventricular wall thickness is normal. Left ventricular systolic function is normal with an ejection fraction of 66 % by Stewart's method of disks. 2. Normal left ventricular diastolic function. 3. Normal right ventricular cavity size, wall thickness and systolic function. 4. No significant valvular disease. 5. No pericardial effusion seen.  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

## 2024-03-08 VITALS
OXYGEN SATURATION: 98 % | TEMPERATURE: 97 F | SYSTOLIC BLOOD PRESSURE: 125 MMHG | WEIGHT: 191.36 LBS | HEIGHT: 61 IN | RESPIRATION RATE: 16 BRPM | HEART RATE: 87 BPM | DIASTOLIC BLOOD PRESSURE: 84 MMHG

## 2024-03-08 RX ORDER — ROSUVASTATIN CALCIUM 5 MG/1
1 TABLET ORAL
Refills: 0 | DISCHARGE

## 2024-03-08 RX ORDER — METFORMIN HYDROCHLORIDE 850 MG/1
1 TABLET ORAL
Refills: 0 | DISCHARGE

## 2024-03-08 NOTE — PRE-OP CHECKLIST - AICD PRESENT
To add to this, I believe that this patient needs to be scheduled for his Medicare Wellness Visit no

## 2024-03-10 ENCOUNTER — TRANSCRIPTION ENCOUNTER (OUTPATIENT)
Age: 39
End: 2024-03-10

## 2024-03-11 ENCOUNTER — INPATIENT (INPATIENT)
Facility: HOSPITAL | Age: 39
LOS: 0 days | Discharge: HOME | DRG: 621 | End: 2024-03-12
Attending: STUDENT IN AN ORGANIZED HEALTH CARE EDUCATION/TRAINING PROGRAM | Admitting: STUDENT IN AN ORGANIZED HEALTH CARE EDUCATION/TRAINING PROGRAM
Payer: COMMERCIAL

## 2024-03-11 ENCOUNTER — APPOINTMENT (OUTPATIENT)
Dept: BARIATRICS | Facility: HOSPITAL | Age: 39
End: 2024-03-11

## 2024-03-11 ENCOUNTER — RESULT REVIEW (OUTPATIENT)
Age: 39
End: 2024-03-11

## 2024-03-11 DIAGNOSIS — Z98.891 HISTORY OF UTERINE SCAR FROM PREVIOUS SURGERY: Chronic | ICD-10-CM

## 2024-03-11 DIAGNOSIS — Z98.890 OTHER SPECIFIED POSTPROCEDURAL STATES: Chronic | ICD-10-CM

## 2024-03-11 LAB
GLUCOSE BLDC GLUCOMTR-MCNC: 95 MG/DL — SIGNIFICANT CHANGE UP (ref 70–99)
HCT VFR BLD CALC: 35.7 % — SIGNIFICANT CHANGE UP (ref 34.5–45)
HGB BLD-MCNC: 11.7 G/DL — SIGNIFICANT CHANGE UP (ref 11.5–15.5)
MCHC RBC-ENTMCNC: 28.1 PG — SIGNIFICANT CHANGE UP (ref 27–34)
MCHC RBC-ENTMCNC: 32.8 GM/DL — SIGNIFICANT CHANGE UP (ref 32–36)
MCV RBC AUTO: 85.6 FL — SIGNIFICANT CHANGE UP (ref 80–100)
NRBC # BLD: 0 /100 WBCS — SIGNIFICANT CHANGE UP (ref 0–0)
PLATELET # BLD AUTO: 323 K/UL — SIGNIFICANT CHANGE UP (ref 150–400)
RBC # BLD: 4.17 M/UL — SIGNIFICANT CHANGE UP (ref 3.8–5.2)
RBC # FLD: 13.2 % — SIGNIFICANT CHANGE UP (ref 10.3–14.5)
WBC # BLD: 10.59 K/UL — HIGH (ref 3.8–10.5)
WBC # FLD AUTO: 10.59 K/UL — HIGH (ref 3.8–10.5)

## 2024-03-11 PROCEDURE — 88307 TISSUE EXAM BY PATHOLOGIST: CPT | Mod: 26

## 2024-03-11 PROCEDURE — 43775 LAP SLEEVE GASTRECTOMY: CPT

## 2024-03-11 PROCEDURE — 43775 LAP SLEEVE GASTRECTOMY: CPT | Mod: AS

## 2024-03-11 PROCEDURE — 99221 1ST HOSP IP/OBS SF/LOW 40: CPT

## 2024-03-11 PROCEDURE — 43281 LAP PARAESOPHAG HERN REPAIR: CPT | Mod: 59

## 2024-03-11 PROCEDURE — S2900 ROBOTIC SURGICAL SYSTEM: CPT | Mod: NC

## 2024-03-11 PROCEDURE — 88302 TISSUE EXAM BY PATHOLOGIST: CPT | Mod: 26

## 2024-03-11 PROCEDURE — 43281 LAP PARAESOPHAG HERN REPAIR: CPT | Mod: AS,59

## 2024-03-11 DEVICE — XI STAPLER SUREFORM RELOAD 60 BLUE: Type: IMPLANTABLE DEVICE | Status: FUNCTIONAL

## 2024-03-11 RX ORDER — ALBUTEROL 90 UG/1
2 AEROSOL, METERED ORAL EVERY 12 HOURS
Refills: 0 | Status: DISCONTINUED | OUTPATIENT
Start: 2024-03-11 | End: 2024-03-12

## 2024-03-11 RX ORDER — HYOSCYAMINE SULFATE 0.13 MG
0.12 TABLET ORAL EVERY 6 HOURS
Refills: 0 | Status: DISCONTINUED | OUTPATIENT
Start: 2024-03-11 | End: 2024-03-12

## 2024-03-11 RX ORDER — ENOXAPARIN SODIUM 100 MG/ML
40 INJECTION SUBCUTANEOUS ONCE
Refills: 0 | Status: COMPLETED | OUTPATIENT
Start: 2024-03-11 | End: 2024-03-11

## 2024-03-11 RX ORDER — BUDESONIDE AND FORMOTEROL FUMARATE DIHYDRATE 160; 4.5 UG/1; UG/1
2 AEROSOL RESPIRATORY (INHALATION)
Refills: 0 | Status: DISCONTINUED | OUTPATIENT
Start: 2024-03-11 | End: 2024-03-12

## 2024-03-11 RX ORDER — OXYCODONE HYDROCHLORIDE 5 MG/1
5 TABLET ORAL EVERY 6 HOURS
Refills: 0 | Status: DISCONTINUED | OUTPATIENT
Start: 2024-03-11 | End: 2024-03-12

## 2024-03-11 RX ORDER — SCOPALAMINE 1 MG/3D
1 PATCH, EXTENDED RELEASE TRANSDERMAL ONCE
Refills: 0 | Status: COMPLETED | OUTPATIENT
Start: 2024-03-11 | End: 2024-03-11

## 2024-03-11 RX ORDER — OXYCODONE HYDROCHLORIDE 5 MG/1
10 TABLET ORAL EVERY 6 HOURS
Refills: 0 | Status: DISCONTINUED | OUTPATIENT
Start: 2024-03-11 | End: 2024-03-12

## 2024-03-11 RX ORDER — THIAMINE MONONITRATE (VIT B1) 100 MG
500 TABLET ORAL EVERY 24 HOURS
Refills: 0 | Status: DISCONTINUED | OUTPATIENT
Start: 2024-03-11 | End: 2024-03-12

## 2024-03-11 RX ORDER — APREPITANT 80 MG/1
80 CAPSULE ORAL ONCE
Refills: 0 | Status: COMPLETED | OUTPATIENT
Start: 2024-03-11 | End: 2024-03-11

## 2024-03-11 RX ORDER — HYDROMORPHONE HYDROCHLORIDE 2 MG/ML
0.5 INJECTION INTRAMUSCULAR; INTRAVENOUS; SUBCUTANEOUS
Refills: 0 | Status: DISCONTINUED | OUTPATIENT
Start: 2024-03-11 | End: 2024-03-11

## 2024-03-11 RX ORDER — BUDESONIDE AND FORMOTEROL FUMARATE DIHYDRATE 160; 4.5 UG/1; UG/1
2 AEROSOL RESPIRATORY (INHALATION)
Refills: 0 | DISCHARGE

## 2024-03-11 RX ORDER — ONDANSETRON 8 MG/1
4 TABLET, FILM COATED ORAL EVERY 6 HOURS
Refills: 0 | Status: DISCONTINUED | OUTPATIENT
Start: 2024-03-11 | End: 2024-03-12

## 2024-03-11 RX ORDER — KETOROLAC TROMETHAMINE 30 MG/ML
15 SYRINGE (ML) INJECTION EVERY 6 HOURS
Refills: 0 | Status: DISCONTINUED | OUTPATIENT
Start: 2024-03-12 | End: 2024-03-12

## 2024-03-11 RX ORDER — SODIUM CHLORIDE 9 MG/ML
1000 INJECTION, SOLUTION INTRAVENOUS
Refills: 0 | Status: DISCONTINUED | OUTPATIENT
Start: 2024-03-11 | End: 2024-03-12

## 2024-03-11 RX ORDER — PANTOPRAZOLE SODIUM 20 MG/1
40 TABLET, DELAYED RELEASE ORAL DAILY
Refills: 0 | Status: DISCONTINUED | OUTPATIENT
Start: 2024-03-11 | End: 2024-03-12

## 2024-03-11 RX ORDER — ALBUTEROL 90 UG/1
2 AEROSOL, METERED ORAL
Refills: 0 | DISCHARGE

## 2024-03-11 RX ORDER — ACETAMINOPHEN 500 MG
1000 TABLET ORAL ONCE
Refills: 0 | Status: COMPLETED | OUTPATIENT
Start: 2024-03-11 | End: 2024-03-11

## 2024-03-11 RX ORDER — ACETAMINOPHEN 500 MG
1000 TABLET ORAL EVERY 6 HOURS
Refills: 0 | Status: DISCONTINUED | OUTPATIENT
Start: 2024-03-11 | End: 2024-03-12

## 2024-03-11 RX ADMIN — PANTOPRAZOLE SODIUM 40 MILLIGRAM(S): 20 TABLET, DELAYED RELEASE ORAL at 19:56

## 2024-03-11 RX ADMIN — Medication 1000 MILLIGRAM(S): at 23:35

## 2024-03-11 RX ADMIN — HYDROMORPHONE HYDROCHLORIDE 0.5 MILLIGRAM(S): 2 INJECTION INTRAMUSCULAR; INTRAVENOUS; SUBCUTANEOUS at 15:57

## 2024-03-11 RX ADMIN — Medication 0.12 MILLIGRAM(S): at 23:36

## 2024-03-11 RX ADMIN — Medication 1000 MILLIGRAM(S): at 18:24

## 2024-03-11 RX ADMIN — SCOPALAMINE 1 PATCH: 1 PATCH, EXTENDED RELEASE TRANSDERMAL at 20:00

## 2024-03-11 RX ADMIN — OXYCODONE HYDROCHLORIDE 5 MILLIGRAM(S): 5 TABLET ORAL at 22:39

## 2024-03-11 RX ADMIN — BUDESONIDE AND FORMOTEROL FUMARATE DIHYDRATE 2 PUFF(S): 160; 4.5 AEROSOL RESPIRATORY (INHALATION) at 22:32

## 2024-03-11 RX ADMIN — HYDROMORPHONE HYDROCHLORIDE 0.5 MILLIGRAM(S): 2 INJECTION INTRAMUSCULAR; INTRAVENOUS; SUBCUTANEOUS at 15:45

## 2024-03-11 RX ADMIN — Medication 105 MILLIGRAM(S): at 18:13

## 2024-03-11 RX ADMIN — SODIUM CHLORIDE 145 MILLILITER(S): 9 INJECTION, SOLUTION INTRAVENOUS at 22:32

## 2024-03-11 RX ADMIN — Medication 1000 MILLIGRAM(S): at 19:38

## 2024-03-11 RX ADMIN — Medication 0.12 MILLIGRAM(S): at 18:23

## 2024-03-11 RX ADMIN — HYDROMORPHONE HYDROCHLORIDE 0.5 MILLIGRAM(S): 2 INJECTION INTRAMUSCULAR; INTRAVENOUS; SUBCUTANEOUS at 16:44

## 2024-03-11 RX ADMIN — Medication 1000 MILLIGRAM(S): at 10:10

## 2024-03-11 RX ADMIN — APREPITANT 80 MILLIGRAM(S): 80 CAPSULE ORAL at 10:36

## 2024-03-11 RX ADMIN — SCOPALAMINE 1 PATCH: 1 PATCH, EXTENDED RELEASE TRANSDERMAL at 10:10

## 2024-03-11 RX ADMIN — HYDROMORPHONE HYDROCHLORIDE 0.5 MILLIGRAM(S): 2 INJECTION INTRAMUSCULAR; INTRAVENOUS; SUBCUTANEOUS at 17:12

## 2024-03-11 RX ADMIN — ENOXAPARIN SODIUM 40 MILLIGRAM(S): 100 INJECTION SUBCUTANEOUS at 10:10

## 2024-03-11 NOTE — H&P ADULT - MLM HIDDEN
"              After Visit Summary   8/25/2017    Dale Ho    MRN: [id number]           Patient Information     Date Of Birth          2008        Visit Information        Provider Department      8/25/2017 11:00 AM Emi Gould MD [facility]        Today's Diagnoses     Encounter for routine child health examination w/o abnormal findings    -  1      Care Instructions      To  whether your child is ready to ride with a seat belt alone, test the fit of your vehicle's belts from time to time. Buckle your child into the back seat without a booster seat, and consider the following:      Does he sit all the way back against the car's seat?    Do his knees bend comfortably at the edge of the seat?    Does the lap belt naturally rest below his belly, touching the top of his thighs?    Is the shoulder belt centered across his shoulder and chest?    Can he stay seated like this for the whole trip?      Preventive Care at the 9-11 Year Visit  Growth Percentiles & Measurements   Weight: 101 lbs 0 oz / 45.8 kg (actual weight) / 95 %ile based on CDC 2-20 Years weight-for-age data using vitals from 8/25/2017.   Length: 4' 8.5\" / 143.5 cm 78 %ile based on CDC 2-20 Years stature-for-age data using vitals from 8/25/2017.   4' 9\" is lowest height for stopping use of booster (with additional requirements above)  BMI: Body mass index is 22.24 kg/(m^2). 95 %ile based on CDC 2-20 Years BMI-for-age data using vitals from 8/25/2017.   Blood Pressure: Blood pressure percentiles are 22.5 % systolic and 49.8 % diastolic based on NHBPEP's 4th Report.     Your child should be seen every one to two years for preventive care.    Development    Friendships will become more important.  Peer pressure may begin.    Set up a routine for talking about school and doing homework.    Limit your child to 1 to 2 hours of quality screen time each day.  Screen time includes television, video game and computer use.  Watch TV " with your child and supervise Internet use.    Spend at least 15 minutes a day reading to or reading with your child.    Teach your child respect for property and other people.    Give your child opportunities for independence within set boundaries.    Diet    Children ages 9 to 11 need 2,000 calories each day.    Between ages 9 to 11 years, your child s bones are growing their fastest.  To help build strong and healthy bones, your child needs 1,300 milligrams (mg) of calcium each day.  he can get this requirement by drinking 3 cups of low-fat or fat-free milk, plus servings of other foods high in calcium (such as yogurt, cheese, orange juice with added calcium, broccoli and almonds).    Until age 8 your child needs 10 mg of iron each day.  Between ages 9 and 13, your child needs 8 mg of iron a day.  Lean beef, iron-fortified cereal, oatmeal, soybeans, spinach and tofu are good sources of iron.    Your child needs 600 IU/day vitamin D which is most easily obtained in a multivitamin or Vitamin D supplement.    Help your child choose fiber-rich fruits, vegetables and whole grains.  Choose and prepare foods and beverages with little added sugars or sweeteners.    Offer your child nutritious snacks like fruits or vegetables.  Remember, snacks are not an essential part of the daily diet and do add to the total calories consumed each day.  A single piece of fruit should be an adequate snack for when your child returns home from school.  Be careful.  Do not over feed your child.  Avoid foods high in sugar or fat.    Let your child help select good choices at the grocery store, help plan and prepare meals, and help clean up.  Always supervise any kitchen activity.    Limit soft drinks and sweetened beverages (including juice) to no more than one a day.      Limit sweets, treats and snack foods (such as chips), fast foods and fried foods.    Exercise    The American Heart Association recommends children get 60 minutes of  moderate to vigorous physical activity each day.  This time can be divided into chunks: 30 minutes physical education in school, 10 minutes playing catch, and a 20-minute family walk.    In addition to helping build strong bones and muscles, regular exercise can reduce risks of certain diseases, reduce stress levels, increase self-esteem, help maintain a healthy weight, improve concentration, and help maintain good cholesterol levels.    Be sure your child wears the right safety gear for his or her activities, such as a helmet, mouth guard, knee pads, eye protection or life vest.    Check bicycles and other sports equipment regularly for needed repairs.    Sleep    Children ages 9 to 11 need at least 9 hours of sleep each night on a regular basis.    Help your child get into a sleep routine: washing@ face, brushing teeth, etc.    Set a regular time to go to bed and wake up at the same time each day. Teach your child to get up when called or when the alarm goes off.    Avoid regular exercise, heavy meals and caffeine right before bed.    Avoid noise and bright rooms.    Your child should not have a television in his bedroom.  It leads to poor sleep habits and increased obesity.     Safety    When riding in a car, your child needs to be buckled in the back seat. Children should not sit in the front seat until 13 years of age or older.  (he may still need a booster seat).  Be sure all other adults and children are buckled as well.    Do not let anyone smoke in your home or around your child.    Practice home fire drills and fire safety.    Supervise your child when he plays outside.  Teach your child what to do if a stranger comes up to him.  Warn your child never to go with a stranger or accept anything from a stranger.  Teach your child to say  NO  and tell an adult he trusts.    Enroll your child in swimming lessons, if appropriate.  Teach your child water safety.  Make sure your child is always supervised whenever  around a pool, lake, or river.    Teach your child animal safety.    Teach your child how to dial and use 911.    Keep all guns out of your child s reach.  Keep guns and ammunition locked up in different parts of the house.    Self-esteem    Provide support, attention and enthusiasm for your child s abilities, achievements and friends.    Support your child s school activities.    Let your child try new skills (such as school or community activities).    Have a reward system with consistent expectations.  Do not use food as a reward.    Discipline    Teach your child consequences for unacceptable or inappropriate behavior.  Talk about your family s values and morals and what is right and wrong.    Use discipline to teach, not punish.  Be fair and consistent with discipline.    Dental Care    The second set of molars comes in between ages 11 and 14.  Ask the dentist about sealants (plastic coatings applied on the chewing surfaces of the back molars).    Make regular dental appointments for cleanings and checkups.    Eye Care    If you or your pediatric provider has concerns, make eye checkups at least every 2 years.  An eye test will be part of the regular well checkups.      ================================================================          Follow-ups after your visit        Who to contact     If you have questions or need follow up information about today's clinic visit or your schedule please contact [facility] directly at [phone].  Normal or non-critical lab and imaging results will be communicated to you by MyChart, letter or phone within 4 business days after the clinic has received the results. If you do not hear from us within 7 days, please contact the clinic through LiveVoxhart or phone. If you have a critical or abnormal lab result, we will notify you by phone as soon as possible.  Submit refill requests through Qwaq or call your pharmacy and they will forward the refill request  "to us. Please allow 3 business days for your refill to be completed.          Additional Information About Your Visit        Libratonehart Information     jigl lets you send messages to your doctor, view your test results, renew your prescriptions, schedule appointments and more. To sign up, go to [email or web address], contact your [facility] clinic or call [phone] during business hours.            Care EveryWhere ID     This is your Care EveryWhere ID. This could be used by other organizations to access your [facility] medical records  [id number]        Your Vitals Were     Pulse Temperature Height Pulse Oximetry BMI (Body Mass Index)       103 97.2  F (36.2  C) (Tympanic) 4' 8.5\" (1.435 m) 98% 22.24 kg/m2        Blood Pressure from Last 3 Encounters:   08/25/17 96/62   10/20/15 95/63   06/21/15 100/60    Weight from Last 3 Encounters:   08/25/17 101 lb (45.8 kg) (95 %)*   05/14/17 96 lb 4 oz (43.7 kg) (95 %)*   10/20/15 78 lb (35.4 kg) (95 %)*     * Growth percentiles are based on CDC 2-20 Years data.              Today, you had the following     No orders found for display       Primary Care Provider Office Phone # Fax #    Emi Gould [phone]       [address]  [facility] [address]        Equal Access to Services     ROSENDA GALDAMEZ AH: Hadii hiwot presley hadasho Sorossanaali, waaxda luqadaha, qaybta kaalmada adefoziayada, nelia aguilera. So [facility] [phone].    ATENCIÓN: Si habla español, tiene a connors disposición servicios gratuitos de asistencia lingüística. Llame al [phone].    We comply with applicable federal civil rights laws and [place] laws. We do not discriminate on the basis of race, color, national origin, age, disability sex, sexual orientation or gender identity.            Thank you!     Thank you for choosing [facility]  for your care. Our goal is always to provide you with excellent care. Hearing back from our patients is one " way we can continue to improve our services. Please take a few minutes to complete the written survey that you may receive in the mail after your visit with us. Thank you!             Your Updated Medication List - Protect others around you: Learn how to safely use, store and throw away your medicines at www.disposemymeds.org.          This list is accurate as of: 8/25/17 11:38 AM.  Always use your most recent med list.                   Brand Name Dispense Instructions for use Diagnosis    NO ACTIVE MEDICATIONS       Routine infant or child health check          yes

## 2024-03-11 NOTE — H&P ADULT - NSICDXPASTMEDICALHX_GEN_ALL_CORE_FT
PAST MEDICAL HISTORY:  Asthma mild intermittent    GERD (gastroesophageal reflux disease)     History of cholelithiasis     History of diverticulitis     History of gallstones     History of insomnia     History of obesity     History of PCOS     History of varicose veins     History of vitamin D deficiency     HLD (hyperlipidemia)     CIARA (obstructive sleep apnea)     Prediabetes

## 2024-03-11 NOTE — BRIEF OPERATIVE NOTE - NSICDXBRIEFPREOP_GEN_ALL_CORE_FT
PRE-OP DIAGNOSIS:  Morbid obesity 11-Mar-2024 15:09:54  Eli Molina  Hiatal hernia 11-Mar-2024 15:10:00  Eli Molina

## 2024-03-11 NOTE — BRIEF OPERATIVE NOTE - NSICDXBRIEFPOSTOP_GEN_ALL_CORE_FT
POST-OP DIAGNOSIS:  Morbid obesity 11-Mar-2024 15:10:05  Eli Molina  Hiatal hernia 11-Mar-2024 15:10:11  Eli Molina

## 2024-03-11 NOTE — H&P ADULT - ASSESSMENT
39 y/o F with pmhx of morbid obesity BMI 37, PCOS, prediabetes, HLD, asthma, mild CIARA (not on CPAP), diverticulitis and pshx of  presnting for sleeve gastrectomy and HH repair. EGD reviewed revealing 1-2cm HH.     Proceed to OR  Admit to Dr. Villeda post op

## 2024-03-11 NOTE — PROGRESS NOTE ADULT - SUBJECTIVE AND OBJECTIVE BOX
POST-OPERATIVE NOTE    Procedure: VSG & HHR    Surgeon: Ronal    S: Patient examined bedside, resting comfortably without complaints. Has not tried BCLD yet. Denies HA, CP, SOB, NV.    O:  T(C): 36.7 (03-11-24 @ 14:57), Max: 36.7 (03-11-24 @ 14:57)  T(F): 98 (03-11-24 @ 14:57), Max: 98 (03-11-24 @ 14:57)  HR: 95 (03-11-24 @ 16:12) (95 - 102)  BP: 144/84 (03-11-24 @ 16:12) (139/78 - 150/85)  RR: 15 (03-11-24 @ 16:12) (15 - 20)  SpO2: 100% (03-11-24 @ 16:12) (98% - 100%)  Wt(kg): --            Gen: NAD, resting comfortably in bed  C/V: NSR  Pulm: Nonlabored breathing, no respiratory distress  Abd: soft, obese, appropriate incisional tenderness, incisions CDI  Extrem: WWP, no calf edema or tenderness, SCDs in place      A/P: 38y Female s/p above procedure  BCLD/IVF  Pain/nausea control  OOBA/SCD/IS  Lovenox POD1  Thiamine  Protonix  Hyoscyamine  AM labs  Nutrition Consult

## 2024-03-11 NOTE — H&P ADULT - HISTORY OF PRESENT ILLNESS
37 y/o F with pmhx of morbid obesity BMI 37, PCOS, prediabetes, HLD, asthma, mild CIARA (not on CPAP), diverticulitis and pshx of  presnting for sleeve gastrectomy and HH repair. EGD reviewed revealing 1-2cm HH.     home meds: Symbicort albuterol

## 2024-03-12 ENCOUNTER — TRANSCRIPTION ENCOUNTER (OUTPATIENT)
Age: 39
End: 2024-03-12

## 2024-03-12 ENCOUNTER — APPOINTMENT (OUTPATIENT)
Dept: BARIATRICS | Facility: CLINIC | Age: 39
End: 2024-03-12

## 2024-03-12 VITALS — WEIGHT: 191.36 LBS

## 2024-03-12 LAB
ANION GAP SERPL CALC-SCNC: 9 MMOL/L — SIGNIFICANT CHANGE UP (ref 5–17)
BASOPHILS # BLD AUTO: 0.01 K/UL — SIGNIFICANT CHANGE UP (ref 0–0.2)
BASOPHILS NFR BLD AUTO: 0.1 % — SIGNIFICANT CHANGE UP (ref 0–2)
BILIRUB SERPL-MCNC: 0.5 MG/DL — SIGNIFICANT CHANGE UP (ref 0.2–1.2)
BUN SERPL-MCNC: 6 MG/DL — LOW (ref 7–23)
CALCIUM SERPL-MCNC: 9.4 MG/DL — SIGNIFICANT CHANGE UP (ref 8.4–10.5)
CHLORIDE SERPL-SCNC: 101 MMOL/L — SIGNIFICANT CHANGE UP (ref 96–108)
CO2 SERPL-SCNC: 24 MMOL/L — SIGNIFICANT CHANGE UP (ref 22–31)
CREAT SERPL-MCNC: 0.57 MG/DL — SIGNIFICANT CHANGE UP (ref 0.5–1.3)
EGFR: 118 ML/MIN/1.73M2 — SIGNIFICANT CHANGE UP
EOSINOPHIL # BLD AUTO: 0.01 K/UL — SIGNIFICANT CHANGE UP (ref 0–0.5)
EOSINOPHIL NFR BLD AUTO: 0.1 % — SIGNIFICANT CHANGE UP (ref 0–6)
GLUCOSE SERPL-MCNC: 113 MG/DL — HIGH (ref 70–99)
HCT VFR BLD CALC: 35.2 % — SIGNIFICANT CHANGE UP (ref 34.5–45)
HGB BLD-MCNC: 11.4 G/DL — LOW (ref 11.5–15.5)
IMM GRANULOCYTES NFR BLD AUTO: 0.4 % — SIGNIFICANT CHANGE UP (ref 0–0.9)
INR BLD: 1.07 — SIGNIFICANT CHANGE UP (ref 0.85–1.18)
LYMPHOCYTES # BLD AUTO: 0.99 K/UL — LOW (ref 1–3.3)
LYMPHOCYTES # BLD AUTO: 9.9 % — LOW (ref 13–44)
MAGNESIUM SERPL-MCNC: 2.1 MG/DL — SIGNIFICANT CHANGE UP (ref 1.6–2.6)
MCHC RBC-ENTMCNC: 28.4 PG — SIGNIFICANT CHANGE UP (ref 27–34)
MCHC RBC-ENTMCNC: 32.4 GM/DL — SIGNIFICANT CHANGE UP (ref 32–36)
MCV RBC AUTO: 87.8 FL — SIGNIFICANT CHANGE UP (ref 80–100)
MELD SCORE WITH DIALYSIS: 22 POINTS — SIGNIFICANT CHANGE UP
MELD SCORE WITHOUT DIALYSIS: 7 POINTS — SIGNIFICANT CHANGE UP
MONOCYTES # BLD AUTO: 0.61 K/UL — SIGNIFICANT CHANGE UP (ref 0–0.9)
MONOCYTES NFR BLD AUTO: 6.1 % — SIGNIFICANT CHANGE UP (ref 2–14)
NEUTROPHILS # BLD AUTO: 8.33 K/UL — HIGH (ref 1.8–7.4)
NEUTROPHILS NFR BLD AUTO: 83.4 % — HIGH (ref 43–77)
NRBC # BLD: 0 /100 WBCS — SIGNIFICANT CHANGE UP (ref 0–0)
PHOSPHATE SERPL-MCNC: 3.3 MG/DL — SIGNIFICANT CHANGE UP (ref 2.5–4.5)
PLATELET # BLD AUTO: 328 K/UL — SIGNIFICANT CHANGE UP (ref 150–400)
POTASSIUM SERPL-MCNC: 4 MMOL/L — SIGNIFICANT CHANGE UP (ref 3.5–5.3)
POTASSIUM SERPL-SCNC: 4 MMOL/L — SIGNIFICANT CHANGE UP (ref 3.5–5.3)
PROTHROM AB SERPL-ACNC: 12.2 SEC — SIGNIFICANT CHANGE UP (ref 9.5–13)
RBC # BLD: 4.01 M/UL — SIGNIFICANT CHANGE UP (ref 3.8–5.2)
RBC # FLD: 13.2 % — SIGNIFICANT CHANGE UP (ref 10.3–14.5)
SODIUM SERPL-SCNC: 134 MMOL/L — LOW (ref 135–145)
WBC # BLD: 9.99 K/UL — SIGNIFICANT CHANGE UP (ref 3.8–10.5)
WBC # FLD AUTO: 9.99 K/UL — SIGNIFICANT CHANGE UP (ref 3.8–10.5)

## 2024-03-12 PROCEDURE — 83735 ASSAY OF MAGNESIUM: CPT

## 2024-03-12 PROCEDURE — S2900: CPT

## 2024-03-12 PROCEDURE — 94640 AIRWAY INHALATION TREATMENT: CPT

## 2024-03-12 PROCEDURE — 85027 COMPLETE CBC AUTOMATED: CPT

## 2024-03-12 PROCEDURE — 80048 BASIC METABOLIC PNL TOTAL CA: CPT

## 2024-03-12 PROCEDURE — 86900 BLOOD TYPING SEROLOGIC ABO: CPT

## 2024-03-12 PROCEDURE — 88302 TISSUE EXAM BY PATHOLOGIST: CPT

## 2024-03-12 PROCEDURE — 36415 COLL VENOUS BLD VENIPUNCTURE: CPT

## 2024-03-12 PROCEDURE — 86850 RBC ANTIBODY SCREEN: CPT

## 2024-03-12 PROCEDURE — 82962 GLUCOSE BLOOD TEST: CPT

## 2024-03-12 PROCEDURE — 86901 BLOOD TYPING SEROLOGIC RH(D): CPT

## 2024-03-12 PROCEDURE — 84100 ASSAY OF PHOSPHORUS: CPT

## 2024-03-12 PROCEDURE — 94660 CPAP INITIATION&MGMT: CPT

## 2024-03-12 PROCEDURE — 88307 TISSUE EXAM BY PATHOLOGIST: CPT

## 2024-03-12 PROCEDURE — 85025 COMPLETE CBC W/AUTO DIFF WBC: CPT

## 2024-03-12 PROCEDURE — C1889: CPT

## 2024-03-12 RX ORDER — ACETAMINOPHEN 500 MG
20 TABLET ORAL
Qty: 320 | Refills: 0
Start: 2024-03-12 | End: 2024-03-15

## 2024-03-12 RX ORDER — ONDANSETRON 8 MG/1
1 TABLET, FILM COATED ORAL
Qty: 12 | Refills: 0
Start: 2024-03-12 | End: 2024-03-14

## 2024-03-12 RX ORDER — ENOXAPARIN SODIUM 100 MG/ML
40 INJECTION SUBCUTANEOUS EVERY 12 HOURS
Refills: 0 | Status: DISCONTINUED | OUTPATIENT
Start: 2024-03-12 | End: 2024-03-12

## 2024-03-12 RX ORDER — ONDANSETRON 8 MG/1
1 TABLET, FILM COATED ORAL
Qty: 16 | Refills: 0
Start: 2024-03-12 | End: 2024-03-15

## 2024-03-12 RX ORDER — OXYCODONE HYDROCHLORIDE 5 MG/1
5 TABLET ORAL
Qty: 80 | Refills: 0
Start: 2024-03-12 | End: 2024-03-15

## 2024-03-12 RX ORDER — APIXABAN 2.5 MG/1
1 TABLET, FILM COATED ORAL
Qty: 60 | Refills: 0
Start: 2024-03-12 | End: 2024-04-10

## 2024-03-12 RX ORDER — HYOSCYAMINE SULFATE 0.13 MG
1 TABLET ORAL
Qty: 28 | Refills: 0
Start: 2024-03-12 | End: 2024-03-18

## 2024-03-12 RX ADMIN — Medication 0.12 MILLIGRAM(S): at 06:12

## 2024-03-12 RX ADMIN — SCOPALAMINE 1 PATCH: 1 PATCH, EXTENDED RELEASE TRANSDERMAL at 06:30

## 2024-03-12 RX ADMIN — Medication 1000 MILLIGRAM(S): at 06:29

## 2024-03-12 RX ADMIN — PANTOPRAZOLE SODIUM 40 MILLIGRAM(S): 20 TABLET, DELAYED RELEASE ORAL at 12:08

## 2024-03-12 RX ADMIN — SODIUM CHLORIDE 145 MILLILITER(S): 9 INJECTION, SOLUTION INTRAVENOUS at 06:13

## 2024-03-12 RX ADMIN — OXYCODONE HYDROCHLORIDE 5 MILLIGRAM(S): 5 TABLET ORAL at 00:16

## 2024-03-12 RX ADMIN — Medication 0.12 MILLIGRAM(S): at 12:08

## 2024-03-12 RX ADMIN — SODIUM CHLORIDE 75 MILLILITER(S): 9 INJECTION, SOLUTION INTRAVENOUS at 07:00

## 2024-03-12 RX ADMIN — Medication 15 MILLIGRAM(S): at 02:50

## 2024-03-12 RX ADMIN — Medication 15 MILLIGRAM(S): at 06:29

## 2024-03-12 RX ADMIN — BUDESONIDE AND FORMOTEROL FUMARATE DIHYDRATE 2 PUFF(S): 160; 4.5 AEROSOL RESPIRATORY (INHALATION) at 09:35

## 2024-03-12 RX ADMIN — Medication 1000 MILLIGRAM(S): at 06:12

## 2024-03-12 RX ADMIN — ENOXAPARIN SODIUM 40 MILLIGRAM(S): 100 INJECTION SUBCUTANEOUS at 09:35

## 2024-03-12 RX ADMIN — Medication 1000 MILLIGRAM(S): at 12:08

## 2024-03-12 RX ADMIN — Medication 15 MILLIGRAM(S): at 09:35

## 2024-03-12 RX ADMIN — Medication 1000 MILLIGRAM(S): at 00:16

## 2024-03-12 NOTE — DISCHARGE NOTE PROVIDER - CARE PROVIDER_API CALL
Noe Villeda  Surgery  [address], [address]  [place] [address]  Phone: [phone]  Fax: [phone]  Scheduled Appointment: 03/27/2024

## 2024-03-12 NOTE — PROGRESS NOTE ADULT - ASSESSMENT
39 y/o F with pmhx of morbid obesity BMI 37, PCOS, prediabetes, HLD, asthma, mild CIARA (not on CPAP), diverticulitis and pshx of  now s/p RA lap VSG and HH repair (3/11)    BCLD/IVF  Pain/nausea control  OOBA/SCD/IS  Lovenox POD1  Thiamine  Protonix  Hyoscyamine  AM labs  Nutrition Consult 37 y/o F with pmhx of morbid obesity BMI 37, PCOS, prediabetes, HLD, asthma, mild CIARA (not on CPAP), diverticulitis and pshx of  now s/p RA lap VSG and HH repair (3/11).     BCLD/IVF  Pain/nausea control  OOBA/SCD/IS  Lovenox POD1  Thiamine  Protonix  Hyoscyamine  AM labs  Nutrition Consult

## 2024-03-12 NOTE — DISCHARGE NOTE PROVIDER - NSDCCPTREATMENT_GEN_ALL_CORE_FT
PRINCIPAL PROCEDURE  Procedure: Robot-assisted sleeve gastrectomy using da Darlin Xi with repair of hiatal hernia  Findings and Treatment:

## 2024-03-12 NOTE — DIETITIAN INITIAL EVALUATION ADULT - OTHER INFO
37 y/o F with PMH of morbid obesity BMI 37, PCOS, prediabetes, HLD, asthma, mild CIARA (not on CPAP), diverticulitis and past surgical history of  now status post RA lap VSG and hiatal hernia repair    Pt seen on [facility] at bedside. On assessment, pt resting in bed. Currently on Bariatric Clear Liquids (BARICLLIQ) diet, tolerating PO. Pt had sips of water out of the clear, 30cc cups. Pain and nausea well controlled. Discussed volumes of various cup sizes on tray table and encouraged aiming for 4 oz/hr as tolerated. Prepared with protein shakes, with plan to get vitamins after discharge. RD provided in-depth education on diet advancement and specific nutrient needs status-post sleeve gastrectomy. No known food allergies. No dietary restrictions at home. Skin: surgical incisions. GI: WDL per flowsheet. Labs reviewed: low sodium (134), low BUN (6), elevated serum Glucose (113); will monitor trends. Pt's wt on admission was 191 pounds, pt's ideal body weight is 105 pounds, pt is 182% of ideal body weight; ideal body weight to be utilized for nutrient calculations. RD observed pt with no overt signs of muscle or fat wasting. Based on ASPEN guidelines, pt does not meet criteria for malnutrition at this time. RD to continue to follow up. 37 y/o F with PMH of morbid obesity BMI 37, PCOS, prediabetes, HLD, asthma, mild CIARA (not on CPAP), diverticulitis and past surgical history of  now status post RA lap VSG and hiatal hernia repair    Pt seen on 9[place] at bedside. On assessment, pt resting in bed. Currently on Bariatric Clear Liquids (BARICLLIQ) diet, tolerating PO. Pt had sips of water out of the clear, 30cc cups. Pain and nausea well controlled. Discussed volumes of various cup sizes on tray table and encouraged aiming for 4 oz/hr as tolerated. Prepared with protein shakes, with plan to get vitamins after discharge. RD provided in-depth education on diet advancement and specific nutrient needs status-post sleeve gastrectomy. Noted with kiwi food allergy. No dietary restrictions at home. Skin: surgical incisions. GI: WDL per flowsheet. Labs reviewed: low sodium (134), low BUN (6), elevated serum Glucose (113); will monitor trends. Pt's wt on admission was 191 pounds, pt's ideal body weight is 105 pounds, pt is 182% of ideal body weight; ideal body weight to be utilized for nutrient calculations. RD observed pt with no overt signs of muscle or fat wasting. Based on ASPEN guidelines, pt does not meet criteria for malnutrition at this time. RD to continue to follow up.

## 2024-03-12 NOTE — DIETITIAN INITIAL EVALUATION ADULT - PERTINENT LABORATORY DATA
03-12    134<L>  |  101  |  6<L>  ----------------------------<  113<H>  4.0   |  24  |  0.57    Ca    9.4      12 Mar 2024 05:30  Phos  3.3     03-12  Mg     2.1     03-12    TPro  x   /  Alb  x   /  TBili  0.5  /  DBili  x   /  AST  x   /  ALT  x   /  AlkPhos  x   03-12

## 2024-03-12 NOTE — PROGRESS NOTE ADULT - SUBJECTIVE AND OBJECTIVE BOX
Overnight events:       POD#    SUBJECTIVE:      MEDICATIONS  (STANDING):  acetaminophen   Oral Liquid .. 1000 milliGRAM(s) Oral every 6 hours  budesonide 160 MICROgram(s)/formoterol 4.5 MICROgram(s) Inhaler 2 Puff(s) Inhalation two times a day  hyoscyamine SL 0.125 milliGRAM(s) SubLingual every 6 hours  ketorolac   Injectable 15 milliGRAM(s) IV Push every 6 hours  lactated ringers. 1000 milliLiter(s) (145 mL/Hr) IV Continuous <Continuous>  pantoprazole  Injectable 40 milliGRAM(s) IV Push daily  thiamine IVPB 500 milliGRAM(s) IV Intermittent every 24 hours    MEDICATIONS  (PRN):  albuterol    90 MICROgram(s) HFA Inhaler 2 Puff(s) Inhalation every 12 hours PRN Shortness of Breath and/or Wheezing  ondansetron Injectable 4 milliGRAM(s) IV Push every 6 hours PRN Nausea and/or Vomiting  oxyCODONE    Solution 10 milliGRAM(s) Oral every 6 hours PRN Severe Pain (7 - 10)  oxyCODONE    Solution 5 milliGRAM(s) Oral every 6 hours PRN Moderate Pain (4 - 6)      Vital Signs Last 24 Hrs  T(C): 37.6 (12 Mar 2024 00:10), Max: 37.6 (12 Mar 2024 00:10)  T(F): 99.7 (12 Mar 2024 00:10), Max: 99.7 (12 Mar 2024 00:10)  HR: 74 (12 Mar 2024 01:09) (74 - 102)  BP: 138/82 (12 Mar 2024 00:10) (125/84 - 150/94)  BP(mean): 105 (11 Mar 2024 19:18) (103 - 117)  RR: 17 (12 Mar 2024 01:09) (12 - 20)  SpO2: 100% (12 Mar 2024 01:09) (95% - 100%)    Parameters below as of 12 Mar 2024 01:09  Patient On (Oxygen Delivery Method): room air    O2 Concentration (%): 40    Physical Exam:  General: NAD, resting comfortably in bed  Pulmonary: Nonlabored breathing, no respiratory distress  Cardiovascular: NSR  Abdominal: soft, NT/ND  Extremities: WWP, normal strength  Neuro: A/O x 3, CNs II-XII grossly intact, no focal deficits, normal motor/sensation  Pulses: palpable distal pulses    I&O's Summary    11 Mar 2024 07:01  -  12 Mar 2024 05:51  --------------------------------------------------------  IN: 1740 mL / OUT: 1700 mL / NET: 40 mL        LABS:                        11.7   10.59 )-----------( 323      ( 11 Mar 2024 20:55 )             35.7               CAPILLARY BLOOD GLUCOSE      POCT Blood Glucose.: 95 mg/dL (11 Mar 2024 10:22)        RADIOLOGY & ADDITIONAL STUDIES:   POD#1 VSG and HHR    SUBJECTIVE: Patient seen at bedside with chief resident. Patient denies any pain, nausea or vomiting. She reports she is going to ambulate today.       MEDICATIONS  (STANDING):  acetaminophen   Oral Liquid .. 1000 milliGRAM(s) Oral every 6 hours  budesonide 160 MICROgram(s)/formoterol 4.5 MICROgram(s) Inhaler 2 Puff(s) Inhalation two times a day  hyoscyamine SL 0.125 milliGRAM(s) SubLingual every 6 hours  ketorolac   Injectable 15 milliGRAM(s) IV Push every 6 hours  lactated ringers. 1000 milliLiter(s) (145 mL/Hr) IV Continuous <Continuous>  pantoprazole  Injectable 40 milliGRAM(s) IV Push daily  thiamine IVPB 500 milliGRAM(s) IV Intermittent every 24 hours    MEDICATIONS  (PRN):  albuterol    90 MICROgram(s) HFA Inhaler 2 Puff(s) Inhalation every 12 hours PRN Shortness of Breath and/or Wheezing  ondansetron Injectable 4 milliGRAM(s) IV Push every 6 hours PRN Nausea and/or Vomiting  oxyCODONE    Solution 10 milliGRAM(s) Oral every 6 hours PRN Severe Pain (7 - 10)  oxyCODONE    Solution 5 milliGRAM(s) Oral every 6 hours PRN Moderate Pain (4 - 6)      Vital Signs Last 24 Hrs  T(C): 37.6 (12 Mar 2024 00:10), Max: 37.6 (12 Mar 2024 00:10)  T(F): 99.7 (12 Mar 2024 00:10), Max: 99.7 (12 Mar 2024 00:10)  HR: 74 (12 Mar 2024 01:09) (74 - 102)  BP: 138/82 (12 Mar 2024 00:10) (125/84 - 150/94)  BP(mean): 105 (11 Mar 2024 19:18) (103 - 117)  RR: 17 (12 Mar 2024 01:09) (12 - 20)  SpO2: 100% (12 Mar 2024 01:09) (95% - 100%)    Parameters below as of 12 Mar 2024 01:09  Patient On (Oxygen Delivery Method): room air    O2 Concentration (%): 40    Physical Exam:  General: NAD, resting comfortably in bed  Pulmonary: Nonlabored breathing, no respiratory distress  Cardiovascular: NSR  Abdominal: soft, ND, Appropriate incisional tenderness. Incision clean, dry, and intact.   Extremities: WWP, normal strength  Neuro: A/O x 3, CNs II-XII grossly intact,    I&O's Summary    11 Mar 2024 07:01  -  12 Mar 2024 05:51  --------------------------------------------------------  IN: 1740 mL / OUT: 1700 mL / NET: 40 mL        LABS:                        11.7   10.59 )-----------( 323      ( 11 Mar 2024 20:55 )             35.7               CAPILLARY BLOOD GLUCOSE      POCT Blood Glucose.: 95 mg/dL (11 Mar 2024 10:22)        RADIOLOGY & ADDITIONAL STUDIES:

## 2024-03-12 NOTE — DISCHARGE NOTE PROVIDER - NSDCFUADDINST_GEN_ALL_CORE_FT
Follow up with Dr. Villeda in 1 to 2 weeks. Call the office at  to schedule your appointment. You may shower; soap and water over incision sites. Do not scrub. Pat dry when done. No tub bathing or swimming until cleared. Keep incision sites out of the sun as scars will darken. No heavy lifting (>10lbs) or strenuous exercise. Diet: Bariatric Full Fluids. 60 grams protein daily.  64 fluid ounces water daily. Drink small sips throughout the day. Continue diet as outlined by paperwork received as a pre-operative patient. You should be urinating at least 3-4x per day. Call the office if you experience increasing abdominal pain, nausea, vomiting, or temperature >100.4F.  NO ASPIRIN OR NSAIDs until approved by Dr. Villeda. Avoid alcoholic beverages until cleared by Dr. Villeda.      1) Please take Tylenol 650 mg every 6 hours by mouth for moderate pain control. Please do not exceed over 4,000 mg of Tylenol a day.        2) Please take Oxycodone 5 mL every 4 to 6 hours by mouth for severe pain control. Please do not exceed over 20 mL a day.      3) Please take [id number] mg every 6 hours by mouth.       4) Please take Omeprazole 40 mg once a day by mouth.       5) Please take Zofran 4 mg every 6 hours as needed for nausea and or vomiting        6) Please take  Eliquis 2.5 mg every 12 hours for 21 days.  Please start Thursday March 14, 2024.

## 2024-03-12 NOTE — DISCHARGE NOTE NURSING/CASE MANAGEMENT/SOCIAL WORK - PATIENT PORTAL LINK FT
You can access the FollowMyHealth Patient Portal offered by [facility] by registering at the following website: [email or web address]. By joining [facility]’s FollowMyHealth portal, you will also be able to view your health information using other applications (apps) compatible with our system.

## 2024-03-12 NOTE — DIETITIAN INITIAL EVALUATION ADULT - OTHER CALCULATIONS
Above energy needs calculated for wt maintenance (20-25kcal/kg ideal body weight).  Weeks 1-2 estimated needs: 477-573kcal/day (10-12kcal/kg ideal body weight), 57-72g protein/day (1.2-1.5g/kg ideal body weight), >/=64oz clear fluids.

## 2024-03-12 NOTE — DISCHARGE NOTE PROVIDER - HOSPITAL COURSE
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 Her postoperative course was unremarkable with tolerance of diet, passing trial of void, and pain control. On day of discharge patient was stable to be d/c'd home.

## 2024-03-12 NOTE — DISCHARGE NOTE PROVIDER - NSDCMRMEDTOKEN_GEN_ALL_CORE_FT
Albuterol (Eqv-ProAir HFA) 90 mcg/inh inhalation aerosol: 2 puff(s) inhaled every 4 to 6 hours as needed for prn  Symbicort 80 mcg-4.5 mcg/inh inhalation aerosol: 2 puff(s) inhaled 2 times a day as needed for prn   acetaminophen 160 mg/5 mL oral liquid: 20 milliliter(s) orally every 6 hours as needed for -for moderate to severe pain MDD: 80 mL  Albuterol (Eqv-ProAir HFA) 90 mcg/inh inhalation aerosol: 2 puff(s) inhaled every 4 to 6 hours as needed for prn  Eliquis 2.5 mg oral tablet: 1 tab(s) orally 2 times a day MDD:2 tablets MDD: 2 tabs  Levsin SL 0.125 mg sublingual tablet: 1 tab(s) sublingually every 6 hours MDD: 4 TABS  ondansetron 4 mg oral tablet: 1 tab(s) orally every 6 hours as needed for  nausea MDD: 4 tabs  Symbicort 80 mcg-4.5 mcg/inh inhalation aerosol: 2 puff(s) inhaled 2 times a day as needed for prn   acetaminophen 160 mg/5 mL oral liquid: 20 milliliter(s) orally every 6 hours as needed for -for moderate to severe pain MDD: 80 mL  Albuterol (Eqv-ProAir HFA) 90 mcg/inh inhalation aerosol: 2 puff(s) inhaled every 4 to 6 hours as needed for prn  Eliquis 2.5 mg oral tablet: 1 tab(s) orally 2 times a day MDD:2 tablets MDD: 2 tabs  Levsin SL 0.125 mg sublingual tablet: 1 tab(s) sublingually every 6 hours MDD: 4 TABS  ondansetron 4 mg oral tablet: 1 tab(s) orally every 6 hours as needed for  nausea MDD: 4 tabs  oxyCODONE 5 mg/5 mL oral solution: 5 milliliter(s) orally every 6 hours as needed for  severe pain MDD: Do not exceed 20mL in one day  Symbicort 80 mcg-4.5 mcg/inh inhalation aerosol: 2 puff(s) inhaled 2 times a day as needed for prn

## 2024-03-12 NOTE — DISCHARGE NOTE PROVIDER - NSDCCPCAREPLAN_GEN_ALL_CORE_FT
PRINCIPAL DISCHARGE DIAGNOSIS  Diagnosis: Morbid obesity  Assessment and Plan of Treatment:      PRINCIPAL DISCHARGE DIAGNOSIS  Diagnosis: Morbid obesity  Assessment and Plan of Treatment: 38 year old female with past medical history of morbid obesity BMI 37, PCOS, prediabetes, HLD, asthma, mild CIARA (not on CPAP), diverticulitis and pshx of , now s/p robotic assisted sleeve gastrectomy and hiatal hernia repair.   1) Please take Tylenol 650 mg every 6 hours by mouth for moderate pain control. Please do not exceed over 4,000 mg of Tylenol a day.      2) Please take Oxycodone 5 mL every 4 to 6 hours by mouth for severe pain control. Please do not exceed over 20 mL a day.    3) Please take [id number] mg every 6 hours by mouth.     4) Please take Omeprazole 40 mg once a day by mouth.     5) Please take Zofran 4 mg every 6 hours as needed for nausea and or vomiting      6) Please take  Eliquis 2.5 mg every 12 hours for 21 days.  Please start .

## 2024-03-12 NOTE — DIETITIAN INITIAL EVALUATION ADULT - PERTINENT MEDS FT
MEDICATIONS  (STANDING):  acetaminophen   Oral Liquid .. 1000 milliGRAM(s) Oral every 6 hours  budesonide 160 MICROgram(s)/formoterol 4.5 MICROgram(s) Inhaler 2 Puff(s) Inhalation two times a day  enoxaparin Injectable 40 milliGRAM(s) SubCutaneous every 12 hours  hyoscyamine SL 0.125 milliGRAM(s) SubLingual every 6 hours  ketorolac   Injectable 15 milliGRAM(s) IV Push every 6 hours  lactated ringers. 1000 milliLiter(s) (75 mL/Hr) IV Continuous <Continuous>  pantoprazole  Injectable 40 milliGRAM(s) IV Push daily  thiamine IVPB 500 milliGRAM(s) IV Intermittent every 24 hours    MEDICATIONS  (PRN):  albuterol    90 MICROgram(s) HFA Inhaler 2 Puff(s) Inhalation every 12 hours PRN Shortness of Breath and/or Wheezing  ondansetron Injectable 4 milliGRAM(s) IV Push every 6 hours PRN Nausea and/or Vomiting  oxyCODONE    Solution 5 milliGRAM(s) Oral every 6 hours PRN Moderate Pain (4 - 6)  oxyCODONE    Solution 10 milliGRAM(s) Oral every 6 hours PRN Severe Pain (7 - 10)

## 2024-03-12 NOTE — DISCHARGE NOTE PROVIDER - NSDCFUSCHEDAPPT_GEN_ALL_CORE_FT
Noe Villeda  [facility]  [facility] [address] [address]  Scheduled Appointment: 03/27/2024

## 2024-03-20 DIAGNOSIS — G47.33 OBSTRUCTIVE SLEEP APNEA (ADULT) (PEDIATRIC): ICD-10-CM

## 2024-03-20 DIAGNOSIS — Z91.018 ALLERGY TO OTHER FOODS: ICD-10-CM

## 2024-03-20 DIAGNOSIS — E78.5 HYPERLIPIDEMIA, UNSPECIFIED: ICD-10-CM

## 2024-03-20 DIAGNOSIS — E28.2 POLYCYSTIC OVARIAN SYNDROME: ICD-10-CM

## 2024-03-20 DIAGNOSIS — K21.9 GASTRO-ESOPHAGEAL REFLUX DISEASE WITHOUT ESOPHAGITIS: ICD-10-CM

## 2024-03-20 DIAGNOSIS — G47.00 INSOMNIA, UNSPECIFIED: ICD-10-CM

## 2024-03-20 DIAGNOSIS — E66.01 MORBID (SEVERE) OBESITY DUE TO EXCESS CALORIES: ICD-10-CM

## 2024-03-20 DIAGNOSIS — K44.9 DIAPHRAGMATIC HERNIA WITHOUT OBSTRUCTION OR GANGRENE: ICD-10-CM

## 2024-03-20 DIAGNOSIS — R73.03 PREDIABETES: ICD-10-CM

## 2024-03-20 DIAGNOSIS — Z79.51 LONG TERM (CURRENT) USE OF INHALED STEROIDS: ICD-10-CM

## 2024-03-20 DIAGNOSIS — J45.20 MILD INTERMITTENT ASTHMA, UNCOMPLICATED: ICD-10-CM

## 2024-03-20 DIAGNOSIS — Z91.09 OTHER ALLERGY STATUS, OTHER THAN TO DRUGS AND BIOLOGICAL SUBSTANCES: ICD-10-CM

## 2024-03-20 LAB — SURGICAL PATHOLOGY STUDY: SIGNIFICANT CHANGE UP

## 2024-03-27 ENCOUNTER — APPOINTMENT (OUTPATIENT)
Dept: BARIATRICS | Facility: CLINIC | Age: 39
End: 2024-03-27
Payer: COMMERCIAL

## 2024-03-27 VITALS
WEIGHT: 174 LBS | HEIGHT: 61 IN | OXYGEN SATURATION: 97 % | DIASTOLIC BLOOD PRESSURE: 78 MMHG | TEMPERATURE: 97.6 F | HEART RATE: 79 BPM | SYSTOLIC BLOOD PRESSURE: 122 MMHG | BODY MASS INDEX: 32.85 KG/M2

## 2024-03-27 PROBLEM — Z87.19 PERSONAL HISTORY OF OTHER DISEASES OF THE DIGESTIVE SYSTEM: Chronic | Status: ACTIVE | Noted: 2024-03-08

## 2024-03-27 PROBLEM — G47.33 OBSTRUCTIVE SLEEP APNEA (ADULT) (PEDIATRIC): Chronic | Status: ACTIVE | Noted: 2024-03-11

## 2024-03-27 PROBLEM — Z86.79 PERSONAL HISTORY OF OTHER DISEASES OF THE CIRCULATORY SYSTEM: Chronic | Status: ACTIVE | Noted: 2024-03-08

## 2024-03-27 PROBLEM — Z86.39 PERSONAL HISTORY OF OTHER ENDOCRINE, NUTRITIONAL AND METABOLIC DISEASE: Chronic | Status: ACTIVE | Noted: 2024-03-08

## 2024-03-27 PROBLEM — K21.9 GASTRO-ESOPHAGEAL REFLUX DISEASE WITHOUT ESOPHAGITIS: Chronic | Status: ACTIVE | Noted: 2024-03-08

## 2024-03-27 PROBLEM — Z87.42 PERSONAL HISTORY OF OTHER DISEASES OF THE FEMALE GENITAL TRACT: Chronic | Status: ACTIVE | Noted: 2024-03-11

## 2024-03-27 PROBLEM — Z87.898 PERSONAL HISTORY OF OTHER SPECIFIED CONDITIONS: Chronic | Status: ACTIVE | Noted: 2024-03-08

## 2024-03-27 PROBLEM — E78.5 HYPERLIPIDEMIA, UNSPECIFIED: Chronic | Status: ACTIVE | Noted: 2024-03-08

## 2024-03-27 PROCEDURE — 99024 POSTOP FOLLOW-UP VISIT: CPT

## 2024-03-27 NOTE — PHYSICAL EXAM
[Obese] : obese [Normal] : affect appropriate [de-identified] : incisions healing well, dermabond intact. no evidence of bleeding, oozing, or infection

## 2024-03-27 NOTE — ASSESSMENT
[FreeTextEntry1] : Pt is a 39 y/o F 2 weeks s/p VSG and HH repair who presents today feeling well here for post op care. Pt denies any post op fevers, chest pn, sob, calf pain, n,v,c,d, reflux, abd pn. Pt states she is meeting her daily protein and fluid intake goals without difficulty. Pt is compliant with her BID eliquis and is taking her vit daily as directed. Pt reports walking daily for exercise and has lost 21 lbs since sx which she is very happy with. Will consult with nutrition today. No other concerns at this time.

## 2024-03-27 NOTE — HISTORY OF PRESENT ILLNESS
[de-identified] : Pt is a 37 y/o F 2 weeks s/p VSG and HH repair who presents today feeling well here for post op care. Pt denies any post op fevers, chest pn, sob, calf pain, n,v,c,d, reflux, abd pn. Pt states she is meeting her daily protein and fluid intake goals without difficulty. Pt is compliant with her BID eliquis and is taking her vit daily as directed. Pt reports walking daily for exercise and has lost 21 lbs since sx which she is very happy with. Will consult with nutrition today. No other concerns at this time.

## 2024-03-27 NOTE — ADDENDUM
[FreeTextEntry1] : I, Dr. Noe Villeda, spent 25 minutes with the patient >50% counseling/coordination of care including, reviewing the patient's history, performing an examination, reviewing relevant labs and radiographic imaging, reviewing PCP and consultant notes, discussion of medical and surgical management of the diagnosis as well as associated risks and benefits, and completing documentation.

## 2024-04-10 ENCOUNTER — APPOINTMENT (OUTPATIENT)
Dept: BARIATRICS | Facility: CLINIC | Age: 39
End: 2024-04-10
Payer: COMMERCIAL

## 2024-04-10 VITALS
WEIGHT: 171 LBS | SYSTOLIC BLOOD PRESSURE: 102 MMHG | OXYGEN SATURATION: 99 % | DIASTOLIC BLOOD PRESSURE: 68 MMHG | BODY MASS INDEX: 32.28 KG/M2 | HEIGHT: 61 IN | TEMPERATURE: 97.5 F | HEART RATE: 80 BPM

## 2024-04-10 DIAGNOSIS — Z98.84 BARIATRIC SURGERY STATUS: ICD-10-CM

## 2024-04-10 PROBLEM — J45.909 UNSPECIFIED ASTHMA, UNCOMPLICATED: Chronic | Status: ACTIVE | Noted: 2024-03-08

## 2024-04-10 PROBLEM — R73.03 PREDIABETES: Chronic | Status: ACTIVE | Noted: 2024-03-08

## 2024-04-10 PROCEDURE — 99024 POSTOP FOLLOW-UP VISIT: CPT

## 2024-04-10 NOTE — ASSESSMENT
[FreeTextEntry1] : Pt is a 39 y/o F 1 mo s/p VSG and HH repair who presents today feeling well here for post op care. Pt reports doing great overall and states she has progressed to solid foods without difficulty. States she has 1 protein shake in the AM and is meeting her daily fluid intake goals of 64oz. States she is compliant with her daily vit regimen. Denies any n,v,c,d, reflux, abd pn. Pt reports walking daily for exercise and has lost 24 lbs since sx. Will consult with nutrition today.

## 2024-04-10 NOTE — HISTORY OF PRESENT ILLNESS
[de-identified] : Pt is a 37 y/o F 1 mo s/p VSG and HH repair who presents today feeling well here for post op care. Pt reports doing great overall and states she has progressed to solid foods without difficulty. States she has 1 protein shake in the AM and is meeting her daily fluid intake goals of 64oz. States she is compliant with her daily vit regimen. Denies any n,v,c,d, reflux, abd pn. Pt reports walking daily for exercise and has lost 24 lbs since sx. Will consult with nutrition today.

## 2024-06-12 ENCOUNTER — APPOINTMENT (OUTPATIENT)
Dept: BARIATRICS | Facility: CLINIC | Age: 39
End: 2024-06-12

## 2024-06-26 ENCOUNTER — NON-APPOINTMENT (OUTPATIENT)
Age: 39
End: 2024-06-26

## 2024-09-12 ENCOUNTER — NON-APPOINTMENT (OUTPATIENT)
Age: 39
End: 2024-09-12

## 2024-09-13 ENCOUNTER — APPOINTMENT (OUTPATIENT)
Dept: PULMONOLOGY | Facility: CLINIC | Age: 39
End: 2024-09-13
Payer: COMMERCIAL

## 2024-09-13 VITALS
OXYGEN SATURATION: 97 % | BODY MASS INDEX: 31.72 KG/M2 | SYSTOLIC BLOOD PRESSURE: 118 MMHG | RESPIRATION RATE: 12 BRPM | HEIGHT: 61 IN | DIASTOLIC BLOOD PRESSURE: 83 MMHG | WEIGHT: 168 LBS | TEMPERATURE: 97.5 F | HEART RATE: 80 BPM

## 2024-09-13 PROCEDURE — 99213 OFFICE O/P EST LOW 20 MIN: CPT

## 2024-09-14 NOTE — HISTORY OF PRESENT ILLNESS
[Never] : never [TextBox_4] : Ms. Sharon Burnett returned to clinic today in follow-up for weight loss surgery and asthma evaluation. In review, Ms. Burnett is a 37 yo F h/o childhood asthma, GERD, mild CIARA, insomnia, and obesity.   2023: We planned PFTs, 6MWT, CXR, and a sleep study. We also discussed trial of PRN Symbicort (i.e., SMART therapy). We also discussed smoke avoidance (i.e., THC).  10/2023: Since our last visit, Ms. Burnett has been feeling well. She has not had ER visits, hospitalizations, nor respiratory exacerbations requiring antibiotics/steroids. She has a "scratchy throat" today. She denies dyspnea, wheezing, fever, or chills. She has stopped using smoked THC since our last visit, which she attributes to less stress in her life.  2024: Ms. Burnett had her weight loss surgery in 3/2024. She has lost 32 pounds, and her joint pain is significantly improved. She is now exercising regularly with light weights and the treadmill. She has stopped smoking marijuana. She is currently going through a divorce. The stress in her life is lower than prior. She is not having asthma symptoms and is not needing her inhaler.  PMH: Asthma GERD Insomnia Cholelithiasis Obesity Mild CIARA HLD Diverticulitis  PSH:   Allergies: none  FH: Mom with asthma, HTN Dad: unknown medical history Grandparent and brother with asthma  SH: Ms. Burnett lives in the [place]. She was raised in [place]. 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  Ms. Burnett is a never smoker. She denies vaping. She smokes marijuana rarely (<1X/month). She drinks alcohol at 1-2 drinks/week.  Ms. Burnett endorses exposure to mold in a prior apartment. She denies exposure to Asbestos, Tuberculosis, mold (in current home), dust, smoke, heavy metals, Beryllium, birds, feathers, or hot tubs.

## 2024-09-14 NOTE — CONSULT LETTER
[Dear  ___] : Dear  [unfilled], [Consult Letter:] : I had the pleasure of evaluating your patient, [unfilled]. [Please see my note below.] : Please see my note below. [Consult Closing:] : Thank you very much for allowing me to participate in the care of this patient.  If you have any questions, please do not hesitate to contact me. [Sincerely,] : Sincerely, [FreeTextEntry2] : Dr. Noe Villeda [FreeTextEntry1] : I saw Ms. Burnett today in consultation. I requested PFTs (normal), CXR (pending), and sleep study (pending). Given her young age and well-controlled asthma, I think she will be low risk of perioperative pulmonary complication. I will see her back in 1 month.  Please call me with additional thoughts.  Pepito Valenzuela C: [phone] [FreeTextEntry3] : Pepito Valenzuela

## 2024-09-14 NOTE — PHYSICAL EXAM
[No Acute Distress] : no acute distress [Normal Appearance] : normal appearance [Normal Rate/Rhythm] : normal rate/rhythm [Normal S1, S2] : normal s1, s2 [No Murmurs] : no murmurs [No Resp Distress] : no resp distress [Clear to Auscultation Bilaterally] : clear to auscultation bilaterally [No Abnormalities] : no abnormalities [Benign] : benign [Normal Gait] : normal gait [No Clubbing] : no clubbing [No Cyanosis] : no cyanosis [No Edema] : no edema [FROM] : FROM [Normal Color/ Pigmentation] : normal color/ pigmentation [No Focal Deficits] : no focal deficits [Oriented x3] : oriented x3 [Normal Affect] : normal affect [TextBox_11] : NC/AT

## 2024-09-14 NOTE — PROCEDURE
[FreeTextEntry1] : We discussed Influenza vaccination. Ms. Burnett was amenable. We discussed risks (soreness, fatigue, fever, chills, rare neurologic injury) and benefits (reduced likelihood of clinically significant Influenza illness and reduced transmission of the virus). Her L deltoid was prepped with alcohol, and I administered the standard-dose Quadrivalent Influenza vaccine ([id number]; Exp 6/2024). A bandage was applied, and the handout was given. There were no immediate complications.

## 2024-09-14 NOTE — ASSESSMENT
[FreeTextEntry1] : Labs: WBC 5.13 (), Hgb 12.1, Plts 330 Na 141, K 4.5, Cl 105, HCO3 26, BUN/creat 9/0.75, glucose 97  CXR (23): The lungs are clear.  PFTs           2023 FEV1/FVC  76% FEV1           2.56, 110% FVC             3.36, 121% TLC             4.42, 100% RV               1.27, 90% DLCO          19.39, 80% -2023: Positive bronchodilator response (FEV1 improved 300 mL, 13%)  6MWT 23: 6MWT distance 548 meters (1798 feet); va SpO2 98% on room air  STOP-BAN/8  Sleep study 2023: AHI 5.8 (CMS criteria). AHI during REM 31.7. SpO2 va 87%. SpO2 at or below 88% for 0.1 minutes during sleep. MARTIN was 5.2.  Asthma Control Test: 23: 24 2024: 23  ESS:  23: 9  A/P: 37 yo F h/o obesity, asthma, GERD, and insomnia returns to clinicasthma evaluation and perioperative pulmonary risk assessment.  Ms. Burnett's asthma remains well-controlled. She was able to obtain the Symbicort for PRN use. She has stopped using marijuana, and her life stress is lower. She is remaining active via regular exercise.  1. Asthma: mild intermittent, well-controlled 2. Obesity 3. Mild CIARA  -continue PRN Symbicort; Albuterol also available -congratulated on weight loss and regular physical activity -avoid inhaled THC -Vaccinations: Influenza 10/4/23 -follow-up with me in in 1 month for Influenza vaccination

## 2024-09-24 ENCOUNTER — APPOINTMENT (OUTPATIENT)
Dept: OBGYN | Facility: CLINIC | Age: 39
End: 2024-09-24

## 2024-09-24 VITALS
WEIGHT: 165.34 LBS | DIASTOLIC BLOOD PRESSURE: 90 MMHG | SYSTOLIC BLOOD PRESSURE: 133 MMHG | BODY MASS INDEX: 31.24 KG/M2

## 2024-09-24 DIAGNOSIS — N83.209 UNSPECIFIED OVARIAN CYST, UNSPECIFIED SIDE: ICD-10-CM

## 2024-09-24 DIAGNOSIS — E28.2 POLYCYSTIC OVARIAN SYNDROME: ICD-10-CM

## 2024-09-24 PROCEDURE — 99204 OFFICE O/P NEW MOD 45 MIN: CPT

## 2024-09-26 NOTE — HISTORY OF PRESENT ILLNESS
[Patient reported PAP Smear was normal] : Patient reported PAP Smear was normal [Y] : Positive pregnancy history [Normal Amount/Duration] :  normal amount and duration [Regular Cycle Intervals] : periods have been regular [Menarche Age: ____] : age at menarche was [unfilled] [Currently Active] : currently active [PapSmeardate] : 12/6/2023 [LMPDate] : 09/5/2024 [MensesFreq] : 28 [MensesLength] : 5 [PGxTotal] : 1 [[facility]] : [facility] [FreeTextEntry1] : 09/05/2024

## 2024-09-26 NOTE — PLAN
[FreeTextEntry1] : 37 yo  presenting for management of PCOS and ovarian cyst. - Referred for TVUS - Encouraged to use condoms until 1 year after surgery if she no longer desires COCPs - RTC 1 year after surgery and can give letrozole to help her become pregnant - RTC after TVUS for discussion of possible ovarian cyst management  Discussed with Dr. Parikh.

## 2024-09-26 NOTE — HISTORY OF PRESENT ILLNESS
[Patient reported PAP Smear was normal] : Patient reported PAP Smear was normal [Y] : Positive pregnancy history [Normal Amount/Duration] :  normal amount and duration [Regular Cycle Intervals] : periods have been regular [Menarche Age: ____] : age at menarche was [unfilled] [Currently Active] : currently active [PapSmeardate] : 12/6/2023 [LMPDate] : 09/5/2024 [MensesFreq] : 28 [MensesLength] : 5 [PGxTotal] : 1 [[facility]] : 1 [FreeTextEntry1] : 09/05/2024

## 2024-09-26 NOTE — PLAN
[FreeTextEntry1] : 39 yo  presenting for management of PCOS and ovarian cyst. - Referred for TVUS - Encouraged to use condoms until 1 year after surgery if she no longer desires COCPs - RTC 1 year after surgery and can give letrozole to help her become pregnant - RTC after TVUS for discussion of possible ovarian cyst management  Discussed with Dr. Parikh.

## 2024-09-26 NOTE — HISTORY OF PRESENT ILLNESS
[Patient reported PAP Smear was normal] : Patient reported PAP Smear was normal [Y] : Positive pregnancy history [Normal Amount/Duration] :  normal amount and duration [Regular Cycle Intervals] : periods have been regular [Menarche Age: ____] : age at menarche was [unfilled] [Currently Active] : currently active [PapSmeardate] : 12/6/2023 [LMPDate] : 09/5/2024 [MensesFreq] : 28 [MensesLength] : 5 [PGxTotal] : 1 [[facility]] : 1 [FreeTextEntry1] : 09/05/2024

## 2024-10-02 ENCOUNTER — APPOINTMENT (OUTPATIENT)
Dept: BARIATRICS | Facility: CLINIC | Age: 39
End: 2024-10-02
Payer: COMMERCIAL

## 2024-10-02 VITALS
OXYGEN SATURATION: 99 % | HEIGHT: 61 IN | DIASTOLIC BLOOD PRESSURE: 85 MMHG | HEART RATE: 78 BPM | BODY MASS INDEX: 30.78 KG/M2 | TEMPERATURE: 97.3 F | SYSTOLIC BLOOD PRESSURE: 118 MMHG | WEIGHT: 163 LBS

## 2024-10-02 DIAGNOSIS — Z98.84 BARIATRIC SURGERY STATUS: ICD-10-CM

## 2024-10-02 DIAGNOSIS — Z00.00 ENCOUNTER FOR GENERAL ADULT MEDICAL EXAMINATION W/OUT ABNORMAL FINDINGS: ICD-10-CM

## 2024-10-02 PROCEDURE — 99214 OFFICE O/P EST MOD 30 MIN: CPT

## 2024-10-02 NOTE — ASSESSMENT
[FreeTextEntry1] : Pt is a 39 y/o F 6 mo s/p VSG who presents today feeling well here for post op care. Pt reports doing great overall and has lost over 30 lbs since sx which she is very happy with. States she is tolerating her diet and continues to focus on consuming adequate daily protein and fiber. Pt reports she is drinking adequate daily fluids and is compliant with her vit. Pt states she is working out throughout the week consisting of cardio and strength training. Will obtain labs today. No other concerns at this time.

## 2024-10-02 NOTE — HISTORY OF PRESENT ILLNESS
[de-identified] : Pt is a 39 y/o F 6 mo s/p VSG who presents today feeling well here for post op care. Pt reports doing great overall and has lost over 30 lbs since sx which she is very happy with. States she is tolerating her diet and continues to focus on consuming adequate daily protein and fiber. Pt reports she is drinking adequate daily fluids and is compliant with her vit. Pt states she is working out throughout the week consisting of cardio and strength training. Will obtain labs today. No other concerns at this time.

## 2024-10-08 LAB
25(OH)D3 SERPL-MCNC: 27.4 NG/ML
A-TOCOPHEROL VIT E SERPL-MCNC: 22.2 MG/L
ALBUMIN SERPL ELPH-MCNC: 4.2 G/DL
ALP BLD-CCNC: 67 U/L
ALT SERPL-CCNC: 11 U/L
ANION GAP SERPL CALC-SCNC: 12 MMOL/L
APTT BLD: 29 SEC
AST SERPL-CCNC: 16 U/L
BASOPHILS # BLD AUTO: 0.03 K/UL
BASOPHILS NFR BLD AUTO: 0.7 %
BETA+GAMMA TOCOPHEROL SERPL-MCNC: 1.7 MG/L
BILIRUB SERPL-MCNC: 0.3 MG/DL
BUN SERPL-MCNC: 11 MG/DL
CA-I SERPL-SCNC: 5.2 MG/DL
CALCIUM SERPL-MCNC: 9.4 MG/DL
CALCIUM SERPL-MCNC: 9.4 MG/DL
CHLORIDE SERPL-SCNC: 101 MMOL/L
CHOLEST SERPL-MCNC: 319 MG/DL
CO2 SERPL-SCNC: 24 MMOL/L
CREAT SERPL-MCNC: 0.77 MG/DL
EGFR: 101 ML/MIN/1.73M2
EOSINOPHIL # BLD AUTO: 0.22 K/UL
EOSINOPHIL NFR BLD AUTO: 5 %
ESTIMATED AVERAGE GLUCOSE: 117 MG/DL
FOLATE SERPL-MCNC: 17.3 NG/ML
GLUCOSE SERPL-MCNC: 79 MG/DL
HBA1C MFR BLD HPLC: 5.7 %
HCT VFR BLD CALC: 39.5 %
HDLC SERPL-MCNC: 48 MG/DL
HGB BLD-MCNC: 12.9 G/DL
IMM GRANULOCYTES NFR BLD AUTO: 0.5 %
INR PPP: 1.01 RATIO
IRON SATN MFR SERPL: 21 %
IRON SERPL-MCNC: 71 UG/DL
LDLC SERPL CALC-MCNC: 225 MG/DL
LYMPHOCYTES # BLD AUTO: 1.57 K/UL
LYMPHOCYTES NFR BLD AUTO: 35.7 %
MAN DIFF?: NORMAL
MCHC RBC-ENTMCNC: 30 PG
MCHC RBC-ENTMCNC: 32.7 GM/DL
MCV RBC AUTO: 91.9 FL
MONOCYTES # BLD AUTO: 0.58 K/UL
MONOCYTES NFR BLD AUTO: 13.2 %
NEUTROPHILS # BLD AUTO: 1.98 K/UL
NEUTROPHILS NFR BLD AUTO: 44.9 %
NONHDLC SERPL-MCNC: 271 MG/DL
PARATHYROID HORMONE INTACT: 28 PG/ML
PLATELET # BLD AUTO: 289 K/UL
POTASSIUM SERPL-SCNC: 4.4 MMOL/L
PREALB SERPL NEPH-MCNC: 23 MG/DL
PROT SERPL-MCNC: 7.4 G/DL
PT BLD: 11.9 SEC
RBC # BLD: 4.3 M/UL
RBC # FLD: 13.6 %
SODIUM SERPL-SCNC: 137 MMOL/L
TIBC SERPL-MCNC: 339 UG/DL
TRIGL SERPL-MCNC: 232 MG/DL
TSH SERPL-ACNC: 1.06 UIU/ML
UIBC SERPL-MCNC: 268 UG/DL
VIT B1 SERPL-MCNC: 113.7 NMOL/L
VIT B12 SERPL-MCNC: 673 PG/ML
WBC # FLD AUTO: 4.4 K/UL
ZINC SERPL-MCNC: 94 UG/DL

## 2024-10-08 RX ORDER — ERGOCALCIFEROL 1.25 MG/1
1.25 MG CAPSULE, LIQUID FILLED ORAL
Qty: 4 | Refills: 0 | Status: ACTIVE | COMMUNITY
Start: 2024-10-08 | End: 1900-01-01

## 2024-10-09 ENCOUNTER — NON-APPOINTMENT (OUTPATIENT)
Age: 39
End: 2024-10-09

## 2024-10-11 ENCOUNTER — APPOINTMENT (OUTPATIENT)
Dept: PULMONOLOGY | Facility: CLINIC | Age: 39
End: 2024-10-11

## 2024-10-11 LAB — VIT A SERPL-MCNC: 44.3 UG/DL

## 2025-01-22 ENCOUNTER — APPOINTMENT (OUTPATIENT)
Dept: BARIATRICS | Facility: CLINIC | Age: 40
End: 2025-01-22

## 2025-07-23 ENCOUNTER — APPOINTMENT (OUTPATIENT)
Dept: BARIATRICS | Facility: CLINIC | Age: 40
End: 2025-07-23

## (undated) DEVICE — NDL HYPO SAFE 22G X 1.5" (BLACK)

## (undated) DEVICE — SUT VLOC 180 3-0 6" V-20 GREEN

## (undated) DEVICE — FORCEP RADIAL JAW 4 W NDL 2.2MM 2.8MM 240CM ORANGE DISP

## (undated) DEVICE — XI VESSEL SEALER

## (undated) DEVICE — SUT MONOCRYL 4-0 18" PS-2

## (undated) DEVICE — TROCAR ETHICON ENDOPATH XCEL BLADELESS 5MM X 100MM STABILITY

## (undated) DEVICE — INSUFFLATION NDL COVIDIEN SURGINEEDLE VERESS 120MM

## (undated) DEVICE — ELCTR BOVIE PENCIL BLADE 10FT

## (undated) DEVICE — XI SEAL UNIV 5- 8 MM

## (undated) DEVICE — PREP CHLORAPREP HI-LITE ORANGE 26ML

## (undated) DEVICE — ELCTR GROUNDING PAD ADULT COVIDIEN

## (undated) DEVICE — D HELP - CLEARVIEW CLEARIFY SYSTEM

## (undated) DEVICE — DRAPE 1/2 SHEET 40X57"

## (undated) DEVICE — GLV 7 PROTEXIS (WHITE)

## (undated) DEVICE — XI ENDOWRIST 12 - 8 MM CANNULA REDUCER

## (undated) DEVICE — XI TIP COVER

## (undated) DEVICE — MARKING PEN W RULER

## (undated) DEVICE — XI OBTURATOR OPTICAL BLADELESS 8MM

## (undated) DEVICE — XI DRAPE ARM

## (undated) DEVICE — Device

## (undated) DEVICE — XI DRAPE COLUMN

## (undated) DEVICE — SYR CONTROL LUER LOK 10CC

## (undated) DEVICE — WARMING BLANKET UPPER ADULT

## (undated) DEVICE — XI STAPLER SUREFORM 60

## (undated) DEVICE — GOWN XL

## (undated) DEVICE — PACK GENERAL LAPAROSCOPY

## (undated) DEVICE — TROCAR COVIDIEN VERSAPORT BLADELESS OPTICAL 5MM STANDARD

## (undated) DEVICE — DRSG DERMABOND 0.7ML

## (undated) DEVICE — XI 12MM AND STAPLER CANNULA SEAL

## (undated) DEVICE — DRSG GAUZE PACKTNER ROLL

## (undated) DEVICE — POSITIONER FOAM EGG CRATE ULNAR 2PCS (PINK)

## (undated) DEVICE — SUT STRATAFIX SPIRAL PDS PLUS 3-0 15CM SH VIOLET

## (undated) DEVICE — DRAPE 3/4 SHEET 52X76"

## (undated) DEVICE — POSITIONER PINK PAD PIGAZZI SYSTEM FULL KIT

## (undated) DEVICE — TUBING STRYKER PNEUMOSURE HI FLOW INSUFFLATOR

## (undated) DEVICE — ELCTR BOVIE PENCIL HANDPIECE ROCKER SWITCH 15FT

## (undated) DEVICE — VENODYNE/SCD SLEEVE CALF MEDIUM

## (undated) DEVICE — SPECIMEN CONTAINER 4OZ

## (undated) DEVICE — SUT VICRYL 0 54" TIES